# Patient Record
Sex: MALE | Race: WHITE | NOT HISPANIC OR LATINO | Employment: FULL TIME | URBAN - METROPOLITAN AREA
[De-identification: names, ages, dates, MRNs, and addresses within clinical notes are randomized per-mention and may not be internally consistent; named-entity substitution may affect disease eponyms.]

---
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
## 2018-03-19 ENCOUNTER — CLINICAL SUPPORT (OUTPATIENT)
Dept: PEDIATRIC CARDIOLOGY | Facility: CLINIC | Age: 18
End: 2018-03-19
Attending: PEDIATRICS
Payer: COMMERCIAL

## 2018-03-19 DIAGNOSIS — I47.20 VT (VENTRICULAR TACHYCARDIA): ICD-10-CM

## 2018-03-19 DIAGNOSIS — Z82.41 FAMILY HISTORY OF SUDDEN CARDIAC DEATH (SCD): ICD-10-CM

## 2018-03-19 PROCEDURE — 93295 DEV INTERROG REMOTE 1/2/MLT: CPT | Mod: S$GLB,,, | Performed by: PEDIATRICS

## 2018-03-19 PROCEDURE — 93296 REM INTERROG EVL PM/IDS: CPT | Mod: S$GLB,,, | Performed by: PEDIATRICS

## 2018-03-19 PROCEDURE — 93297 REM INTERROG DEV EVAL ICPMS: CPT | Mod: S$GLB,,, | Performed by: PEDIATRICS

## 2018-03-23 ENCOUNTER — TELEPHONE (OUTPATIENT)
Dept: PEDIATRIC CARDIOLOGY | Facility: CLINIC | Age: 18
End: 2018-03-23

## 2018-03-23 DIAGNOSIS — Z95.810 S/P ICD (INTERNAL CARDIAC DEFIBRILLATOR) PROCEDURE: ICD-10-CM

## 2018-03-23 DIAGNOSIS — I47.20 SUSTAINED VT (VENTRICULAR TACHYCARDIA): ICD-10-CM

## 2018-03-23 DIAGNOSIS — Z87.898 HISTORY OF SYNCOPE: ICD-10-CM

## 2018-03-23 DIAGNOSIS — I34.0 MITRAL VALVE INSUFFICIENCY, UNSPECIFIED ETIOLOGY: ICD-10-CM

## 2018-03-23 DIAGNOSIS — I47.20 SUSTAINED VT (VENTRICULAR TACHYCARDIA): Primary | ICD-10-CM

## 2018-03-23 RX ORDER — NADOLOL 20 MG/1
10 TABLET ORAL DAILY
Qty: 45 TABLET | Refills: 0 | Status: SHIPPED | OUTPATIENT
Start: 2018-03-23 | End: 2018-04-10 | Stop reason: SDUPTHER

## 2018-03-23 NOTE — TELEPHONE ENCOUNTER
Phoned Pablo advised him that Gudelia is going to refill Nadolol for 1 month. He needs f/u for April. Appointment made for 04/10/2018 @0900. Advised Pablo RX will be given at appointment Pablo verbalizes understanding.

## 2018-03-26 RX ORDER — NADOLOL 20 MG/1
10 TABLET ORAL DAILY
Qty: 45 TABLET | Refills: 0 | OUTPATIENT
Start: 2018-03-26 | End: 2019-03-26

## 2018-04-10 ENCOUNTER — OFFICE VISIT (OUTPATIENT)
Dept: PEDIATRIC CARDIOLOGY | Facility: CLINIC | Age: 18
End: 2018-04-10
Payer: COMMERCIAL

## 2018-04-10 VITALS
WEIGHT: 144.31 LBS | BODY MASS INDEX: 20.66 KG/M2 | HEART RATE: 50 BPM | RESPIRATION RATE: 20 BRPM | OXYGEN SATURATION: 100 % | HEIGHT: 70 IN | DIASTOLIC BLOOD PRESSURE: 58 MMHG | SYSTOLIC BLOOD PRESSURE: 108 MMHG

## 2018-04-10 DIAGNOSIS — Z87.898 HISTORY OF SYNCOPE: ICD-10-CM

## 2018-04-10 DIAGNOSIS — Z95.810 S/P ICD (INTERNAL CARDIAC DEFIBRILLATOR) PROCEDURE: ICD-10-CM

## 2018-04-10 DIAGNOSIS — I47.20 SUSTAINED VT (VENTRICULAR TACHYCARDIA): ICD-10-CM

## 2018-04-10 DIAGNOSIS — I34.0 MITRAL VALVE INSUFFICIENCY, UNSPECIFIED ETIOLOGY: ICD-10-CM

## 2018-04-10 DIAGNOSIS — Z82.41 FAMILY HISTORY OF SUDDEN CARDIAC DEATH (SCD): ICD-10-CM

## 2018-04-10 PROCEDURE — 93000 ELECTROCARDIOGRAM COMPLETE: CPT | Mod: S$GLB,,, | Performed by: PEDIATRICS

## 2018-04-10 PROCEDURE — 99214 OFFICE O/P EST MOD 30 MIN: CPT | Mod: S$GLB,,, | Performed by: NURSE PRACTITIONER

## 2018-04-10 RX ORDER — NADOLOL 20 MG/1
10 TABLET ORAL DAILY
Qty: 45 TABLET | Refills: 2 | Status: SHIPPED | OUTPATIENT
Start: 2018-04-10 | End: 2018-09-26 | Stop reason: SDUPTHER

## 2018-04-10 NOTE — PATIENT INSTRUCTIONS
Michael Valerio MD  Pediatric Cardiology  [address] [place] [address]  [phone]    General Guidelines    Name: Pablo Zamudio                   : 2000    Diagnosis:   1. Mitral valve insufficiency, unspecified etiology    2. Sustained VT (ventricular tachycardia)    3. S/P ICD (internal cardiac defibrillator) procedure    4. History of syncope    5. Family history of sudden cardiac death (SCD)        PCP: [facility]  PCP Phone Number: [phone]    · If you have an emergency or you think you have an emergency, go to the nearest emergency room!     · Breathing too fast, doesnt look right, consistently not eating well, your child needs to be checked. These are general indications that your child is not feeling well. This may be caused by anything, a stomach virus, an ear ache or heart disease, so please call [facility]. If [facility] thinks you need to be checked for your heart, they will let us know.     · If your child experiences a rapid or very slow heart rate and has the following symptoms, call [facility] or go to the nearest emergency room.   · unexplained chest pain   · does not look right   · feels like they are going to pass out   · actually passes out for unexplained reasons   · weakness or fatigue   · shortness of breath  or breathing fast   · consistent poor feeding     · If your child experiences a rapid or very slow heart rate that lasts longer than 30 minutes call [facility] or go to the nearest emergency room.     · If your child feels like they are going to pass out - have them sit down or lay down immediately. Raise the feet above the head (prop the feet on a chair or the wall) until the feeling passes. Slowly allow the child to sit, then stand. If the feeling returns, lay back down and start over.     It is very important that you notify [facility] first. [facility]  Clinic or the ER Physician can reach Dr. Michael Valerio at the office or through [facility] PICU at [phone] as needed.    Call our office ([phone]) one week after ALL tests for results.     PREVENTION OF BACTERIAL ENDOCARDITIS    A COPY OF THIS SHEET MUST BE GIVEN TO ALL OF YOUR DOCTORS OR HEALTH CARE PROVIDERS    You have received this information because you are at an increased risk for developing adverse outcomes from bacterial endocarditis or infective endocarditis.     Patient Name:  [unfilled]     : 2000   Diagnosis:   1. Mitral valve insufficiency, unspecified etiology    2. Sustained VT (ventricular tachycardia)    3. S/P ICD (internal cardiac defibrillator) procedure    4. History of syncope    5. Family history of sudden cardiac death (SCD)        As of 4/10/2018, Dr. Michael Valerio, Pediatric Cardiologist recommends that Pablo receive COMPLETE USE of antibiotic prophylaxis from bacterial endocarditis because of an existing heart condition.   Complete use antibiotic prophylaxis with dental or surgical procedures is recommended only for patients with cardiac conditions associated with the highest risk of adverse outcomes from endocarditis, includin) Artificial heart valve; 2) A history of endocarditis infection; 3) A cardiac transplantation recipients with cardiac valvular disease; 4) Certain serious congenital heart conditions including a) Unrepaired/incompletely repaired cyanotic heart disease (including shunts & conduits); b) A completely repaired congenital heart defect with prosthetic material or device for the first six months after the procedure; c) Any repaired congenital heart defect with residual defect at the site or adjacent to the site of a prosthetic patch or prosthetic device (which inhibit endothelialization).    Dental procedures for which prophylaxis is recommended in patients with the cardiac conditions are: 1) All dental procedures that involve manipulation  of gingival tissue or the periapical region of teeth or; 2) perforation of the oral mucosa.  Antibiotic prophylaxis is NOT recommended for the following dental procedures or events: routine anesthetic injections through non-infected tissue; taking dental radiographs; placement of removable prosthodontic or orthodontic appliances; adjustment of orthodontic appliances; placement of orthodontic brackets; and shedding of deciduous teeth or bleeding from trauma to the lips or oral mucosa.    Antibiotic Prophylactic Regimens Recommended for Dental Procedures  ---Regimen - Single Dose 30-60 minutes before Procedure---  Situation Agent Adults Children   Oral Amoxicillin 2g 50/mg/kg   Unable to take oral meds Ampicillin   OR  Cefazolin or ceftriaxone 2 g IM or IV1    1 g IM or IV 50 mg/kg IM or IV    50 mg/kg IM or IV   Allergic to Penicillins   or   ampicillin-    Oral regimen Cephalexin 2  OR  Clindamycin  OR  Azithromycin or clarithromycin 2 g    600 mg    500 mg 50 mg/kg    20 mg/kg    15 mg/kg   Allergic to penicillin or ampicillin and unable to take oral medications Cefazolin or ceftriaxone 3  OR  Clindamycin 1 g IM or IV      600 mg IM or IV 50 mg/kg IM or IV      20 mg/kg IM or IV   1IM - intramuscular; IV - intravenous                               2Or other first or second generation oral cephalosporin in equivalent adult or pediatric dosage.  3Cephalosporins should not be used in an individual with a history of anaphylaxis, angioedema or urticaria with penicillin or ampicillin.   Adapted from Prevention of Infective Endocarditis: Guidelines From the American Heart Association, by the Committee on Rheumatic Fever, Endocarditis, and Kawasaki Disease. Circulation, e-published April 19, 2007. Go to www.americanheart.org/presenter for more information.  The practice of giving patients antibiotics prior to a dental procedure is no longer recommended EXCEPT for patients with the highest risk of adverse outcomes  resulting from bacterial endocarditis. We cannot exclude the possibility that an exceedingly small number of cases, if any, of bacterial endocarditis may be prevented by antibiotic prophylaxis prior to a dental procedure.The importance of good oral and dental health and regular visits to the dentist is important for patients at risk for bacterial endocarditis.  Gastrointestinal (GI)/Genitourinary () Procedures: Antibiotic prophylaxis solely to prevent bacterial endocarditis is no longer recommended for patients who undergo a Gl or  tract procedure, including patients with the highest risk of adverse outcomes due to bacterial endocarditis.
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
## 2018-04-10 NOTE — LETTER
April 10, 2018      Gudelia Sylvester PA-C  [address]  [address] - [facility] Cardiology  [address]  [facility] [address]  Phone: [phone]  Fax: [phone]          Patient: Pablo Zamudio   MR Number: [id number]   YOB: 2000   Date of Visit: 4/10/2018       Dear Gudelia Sylvester:    Thank you for referring Pablo Zamudio to me for evaluation. Attached you will find relevant portions of my assessment and plan of care.    If you have questions, please do not hesitate to call me. I look forward to following Pablo Zamudio along with you.    Sincerely,    Donny Landry, NP    Enclosure  CC:  No Recipients    If you would like to receive this communication electronically, please contact [email or web address] or [phone] to request more information on [employer] access.    For providers and/or their staff who would like to refer a patient to [facility], please contact us through our one-stop-shop provider referral line, [facility], at [phone].    If you feel you have received this communication in error or would no longer like to receive these types of communications, please e-mail [email or web address]

## 2018-06-25 ENCOUNTER — CLINICAL SUPPORT (OUTPATIENT)
Dept: PEDIATRIC CARDIOLOGY | Facility: CLINIC | Age: 18
End: 2018-06-25
Payer: COMMERCIAL

## 2018-06-25 DIAGNOSIS — Z82.41 FAMILY HISTORY OF SUDDEN CARDIAC DEATH (SCD): ICD-10-CM

## 2018-06-25 DIAGNOSIS — I47.20 SUSTAINED VT (VENTRICULAR TACHYCARDIA): Primary | ICD-10-CM

## 2018-06-25 DIAGNOSIS — I47.20 VT (VENTRICULAR TACHYCARDIA): ICD-10-CM

## 2018-06-25 PROCEDURE — 93297 REM INTERROG DEV EVAL ICPMS: CPT | Mod: S$GLB,,, | Performed by: PEDIATRICS

## 2018-06-25 PROCEDURE — 93295 DEV INTERROG REMOTE 1/2/MLT: CPT | Mod: S$GLB,,, | Performed by: PEDIATRICS

## 2018-06-25 PROCEDURE — 93296 REM INTERROG EVL PM/IDS: CPT | Mod: S$GLB,,, | Performed by: PEDIATRICS

## 2018-09-26 ENCOUNTER — TELEPHONE (OUTPATIENT)
Dept: PEDIATRIC CARDIOLOGY | Facility: CLINIC | Age: 18
End: 2018-09-26

## 2018-09-26 DIAGNOSIS — Z82.41 FAMILY HISTORY OF SUDDEN CARDIAC DEATH (SCD): Primary | ICD-10-CM

## 2018-09-26 DIAGNOSIS — I47.20 SUSTAINED VT (VENTRICULAR TACHYCARDIA): ICD-10-CM

## 2018-09-26 DIAGNOSIS — Z95.810 S/P ICD (INTERNAL CARDIAC DEFIBRILLATOR) PROCEDURE: ICD-10-CM

## 2018-09-26 DIAGNOSIS — Z87.898 HISTORY OF SYNCOPE: ICD-10-CM

## 2018-09-26 DIAGNOSIS — I34.0 MITRAL VALVE INSUFFICIENCY, UNSPECIFIED ETIOLOGY: ICD-10-CM

## 2018-09-26 NOTE — TELEPHONE ENCOUNTER
----- Message from Vickie Haynes sent at 9/26/2018  3:38 PM CDT -----  Contact: Self Pablo [phone]  Needs Advice    Reason for call: Scheduling        Communication Preference: Self Pablo [phone]    Additional Information: Pt needs to schedule f/u with ICD chk, ekg and echo in [facility].

## 2018-09-26 NOTE — TELEPHONE ENCOUNTER
Notified patient refill request has been submitted to provider. After it is signed it will go to the pharmacy.

## 2018-09-26 NOTE — TELEPHONE ENCOUNTER
----- Message from Vickie Laws MA sent at 9/26/2018  3:35 PM CDT -----  Regarding: Med Rx  Contact: [phone]  Patient called stating he needs his medication refilled. He did not recall the name of the medications, but requested that we sent it to [facility].Pt's #- [phone]

## 2018-09-26 NOTE — TELEPHONE ENCOUNTER
Left message that appointments scheduled for Nov 26th at 1pm in [facility]. Call back number left if any questions or issues.

## 2018-09-27 RX ORDER — NADOLOL 20 MG/1
10 TABLET ORAL DAILY
Qty: 45 TABLET | Refills: 3 | Status: SHIPPED | OUTPATIENT
Start: 2018-09-27 | End: 2019-03-29 | Stop reason: SDUPTHER

## 2018-10-01 ENCOUNTER — CLINICAL SUPPORT (OUTPATIENT)
Dept: PEDIATRIC CARDIOLOGY | Facility: CLINIC | Age: 18
End: 2018-10-01
Payer: COMMERCIAL

## 2018-10-01 DIAGNOSIS — I47.20 SUSTAINED VT (VENTRICULAR TACHYCARDIA): ICD-10-CM

## 2018-10-01 PROCEDURE — 93295 DEV INTERROG REMOTE 1/2/MLT: CPT | Mod: S$GLB,,, | Performed by: PEDIATRICS

## 2018-10-01 PROCEDURE — 93296 REM INTERROG EVL PM/IDS: CPT | Mod: S$GLB,,, | Performed by: PEDIATRICS

## 2018-11-15 DIAGNOSIS — I47.20 SUSTAINED VT (VENTRICULAR TACHYCARDIA): Primary | ICD-10-CM

## 2018-11-15 DIAGNOSIS — Z82.41 FAMILY HISTORY OF SUDDEN CARDIAC DEATH (SCD): ICD-10-CM

## 2018-11-26 ENCOUNTER — CLINICAL SUPPORT (OUTPATIENT)
Dept: PEDIATRIC CARDIOLOGY | Facility: CLINIC | Age: 18
End: 2018-11-26
Payer: COMMERCIAL

## 2018-11-26 ENCOUNTER — OFFICE VISIT (OUTPATIENT)
Dept: PEDIATRIC CARDIOLOGY | Facility: CLINIC | Age: 18
End: 2018-11-26
Payer: COMMERCIAL

## 2018-11-26 ENCOUNTER — CLINICAL SUPPORT (OUTPATIENT)
Dept: PEDIATRIC CARDIOLOGY | Facility: CLINIC | Age: 18
End: 2018-11-26
Attending: PEDIATRICS
Payer: COMMERCIAL

## 2018-11-26 VITALS
HEIGHT: 70 IN | DIASTOLIC BLOOD PRESSURE: 59 MMHG | WEIGHT: 117.06 LBS | HEART RATE: 53 BPM | RESPIRATION RATE: 20 BRPM | OXYGEN SATURATION: 100 % | BODY MASS INDEX: 16.76 KG/M2 | SYSTOLIC BLOOD PRESSURE: 108 MMHG

## 2018-11-26 DIAGNOSIS — Z87.898 HISTORY OF SYNCOPE: ICD-10-CM

## 2018-11-26 DIAGNOSIS — I47.20 SUSTAINED VT (VENTRICULAR TACHYCARDIA): ICD-10-CM

## 2018-11-26 DIAGNOSIS — Z82.41 FAMILY HISTORY OF SUDDEN CARDIAC DEATH (SCD): ICD-10-CM

## 2018-11-26 DIAGNOSIS — Z95.810 S/P ICD (INTERNAL CARDIAC DEFIBRILLATOR) PROCEDURE: ICD-10-CM

## 2018-11-26 PROCEDURE — 99214 OFFICE O/P EST MOD 30 MIN: CPT | Mod: 25,S$GLB,, | Performed by: PEDIATRICS

## 2018-11-26 PROCEDURE — 93282 PRGRMG EVAL IMPLANTABLE DFB: CPT | Mod: S$GLB,,, | Performed by: PEDIATRICS

## 2018-11-26 PROCEDURE — 3008F BODY MASS INDEX DOCD: CPT | Mod: CPTII,S$GLB,, | Performed by: PEDIATRICS

## 2018-11-26 PROCEDURE — 93000 ELECTROCARDIOGRAM COMPLETE: CPT | Mod: S$GLB,,, | Performed by: PEDIATRICS

## 2018-11-26 NOTE — LETTER
November 29, 2018        [address] [address]             [facility] Card[facility]  [address]  [facility] [address]  Phone: [phone]  Fax: [phone]   Patient: Pablo Zamudio   MR Number: [id number]   YOB: 2000   Date of Visit: 11/26/2018       Dear [facility]:    Thank you for referring Pablo Zamudio to me for evaluation. Attached you will find relevant portions of my assessment and plan of care.    If you have questions, please do not hesitate to call me. I look forward to following Pablo Zamudio along with you.    Sincerely,      French Ballesteros MD            CC  No Recipients    Enclosure
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
## 2018-12-31 LAB
BATTERY VOLTAGE (V): 2.94 V
CHARGE TIME (SEC): 10.9 SEC
HV IMPEDANCE (OHM): 55 OHM
IMPEDANCE RA LEAD: 399 OHMS
OHS CV DC PP MS1: 0.4 MS
OHS CV DC PP V1: 2 V
P/R-WAVE RA LEAD: 6.1 MV
THRESHOLD MS RA LEAD: 0.4 MS
THRESHOLD V RA LEAD: 0.75 V

## 2019-03-01 ENCOUNTER — PATIENT MESSAGE (OUTPATIENT)
Dept: ADMINISTRATIVE | Facility: OTHER | Age: 19
End: 2019-03-01

## 2019-03-07 ENCOUNTER — TELEPHONE (OUTPATIENT)
Dept: PEDIATRIC CARDIOLOGY | Facility: CLINIC | Age: 19
End: 2019-03-07

## 2019-03-07 NOTE — TELEPHONE ENCOUNTER
Spoke with mom. Pablo past due for ICD REMOTE transmission. She stated she would call him and have him do transmission.

## 2019-03-29 DIAGNOSIS — Z95.810 S/P ICD (INTERNAL CARDIAC DEFIBRILLATOR) PROCEDURE: ICD-10-CM

## 2019-03-29 DIAGNOSIS — Z87.898 HISTORY OF SYNCOPE: ICD-10-CM

## 2019-03-29 DIAGNOSIS — I34.0 MITRAL VALVE INSUFFICIENCY, UNSPECIFIED ETIOLOGY: ICD-10-CM

## 2019-03-29 DIAGNOSIS — I47.20 SUSTAINED VT (VENTRICULAR TACHYCARDIA): ICD-10-CM

## 2019-04-01 RX ORDER — NADOLOL 20 MG/1
10 TABLET ORAL DAILY
Qty: 90 TABLET | Refills: 0 | Status: SHIPPED | OUTPATIENT
Start: 2019-04-01 | End: 2019-04-02 | Stop reason: SDUPTHER

## 2019-04-02 DIAGNOSIS — Z95.810 S/P ICD (INTERNAL CARDIAC DEFIBRILLATOR) PROCEDURE: ICD-10-CM

## 2019-04-02 DIAGNOSIS — Z87.898 HISTORY OF SYNCOPE: ICD-10-CM

## 2019-04-02 DIAGNOSIS — I47.20 SUSTAINED VT (VENTRICULAR TACHYCARDIA): ICD-10-CM

## 2019-04-02 DIAGNOSIS — I34.0 MITRAL VALVE INSUFFICIENCY, UNSPECIFIED ETIOLOGY: ICD-10-CM

## 2019-04-02 RX ORDER — NADOLOL 20 MG/1
10 TABLET ORAL DAILY
Qty: 90 TABLET | Refills: 1 | Status: SHIPPED | OUTPATIENT
Start: 2019-04-02 | End: 2019-09-30 | Stop reason: SDUPTHER

## 2019-04-05 ENCOUNTER — PATIENT MESSAGE (OUTPATIENT)
Dept: ADMINISTRATIVE | Facility: OTHER | Age: 19
End: 2019-04-05

## 2019-04-08 ENCOUNTER — CLINICAL SUPPORT (OUTPATIENT)
Dept: PEDIATRIC CARDIOLOGY | Facility: CLINIC | Age: 19
End: 2019-04-08
Attending: PEDIATRICS
Payer: COMMERCIAL

## 2019-04-08 DIAGNOSIS — Z82.41 FAMILY HISTORY OF SUDDEN CARDIAC DEATH (SCD): ICD-10-CM

## 2019-04-08 DIAGNOSIS — I47.20 SUSTAINED VT (VENTRICULAR TACHYCARDIA): ICD-10-CM

## 2019-04-08 PROCEDURE — 93296 REM INTERROG EVL PM/IDS: CPT | Mod: S$GLB,,, | Performed by: PEDIATRICS

## 2019-04-08 PROCEDURE — 93295 DEV INTERROG REMOTE 1/2/MLT: CPT | Mod: S$GLB,,, | Performed by: PEDIATRICS

## 2019-04-08 PROCEDURE — 93296 CV ICD REMOTE PEDIATRICS (CUPID ONLY): ICD-10-PCS | Mod: S$GLB,,, | Performed by: PEDIATRICS

## 2019-04-08 PROCEDURE — 93295 CV ICD REMOTE PEDIATRICS (CUPID ONLY): ICD-10-PCS | Mod: S$GLB,,, | Performed by: PEDIATRICS

## 2019-04-09 ENCOUNTER — TELEPHONE (OUTPATIENT)
Dept: PEDIATRIC CARDIOLOGY | Facility: CLINIC | Age: 19
End: 2019-04-09

## 2019-05-15 ENCOUNTER — OFFICE VISIT (OUTPATIENT)
Dept: PEDIATRIC CARDIOLOGY | Facility: CLINIC | Age: 19
End: 2019-05-15
Payer: COMMERCIAL

## 2019-05-15 VITALS
HEIGHT: 70 IN | HEART RATE: 57 BPM | RESPIRATION RATE: 16 BRPM | DIASTOLIC BLOOD PRESSURE: 64 MMHG | BODY MASS INDEX: 20.84 KG/M2 | OXYGEN SATURATION: 99 % | SYSTOLIC BLOOD PRESSURE: 112 MMHG | WEIGHT: 145.56 LBS

## 2019-05-15 DIAGNOSIS — Z87.898 HISTORY OF SYNCOPE: ICD-10-CM

## 2019-05-15 DIAGNOSIS — I47.20 SUSTAINED VT (VENTRICULAR TACHYCARDIA): ICD-10-CM

## 2019-05-15 DIAGNOSIS — I34.0 NON-RHEUMATIC MITRAL REGURGITATION: ICD-10-CM

## 2019-05-15 DIAGNOSIS — Z95.810 S/P ICD (INTERNAL CARDIAC DEFIBRILLATOR) PROCEDURE: ICD-10-CM

## 2019-05-15 PROCEDURE — 3008F BODY MASS INDEX DOCD: CPT | Mod: CPTII,S$GLB,, | Performed by: NURSE PRACTITIONER

## 2019-05-15 PROCEDURE — 3008F PR BODY MASS INDEX (BMI) DOCUMENTED: ICD-10-PCS | Mod: CPTII,S$GLB,, | Performed by: NURSE PRACTITIONER

## 2019-05-15 PROCEDURE — 93000 ELECTROCARDIOGRAM COMPLETE: CPT | Mod: S$GLB,,, | Performed by: PEDIATRICS

## 2019-05-15 PROCEDURE — 99213 OFFICE O/P EST LOW 20 MIN: CPT | Mod: S$GLB,,, | Performed by: NURSE PRACTITIONER

## 2019-05-15 PROCEDURE — 99213 PR OFFICE/OUTPT VISIT, EST, LEVL III, 20-29 MIN: ICD-10-PCS | Mod: S$GLB,,, | Performed by: NURSE PRACTITIONER

## 2019-05-15 PROCEDURE — 93000 PR ELECTROCARDIOGRAM, COMPLETE: ICD-10-PCS | Mod: S$GLB,,, | Performed by: PEDIATRICS

## 2019-05-15 NOTE — PATIENT INSTRUCTIONS
Michael Valerio MD  Pediatric Cardiology  [address] [place] [address]  [phone]    General Guidelines    Name: Pablo Zamudio                   : 2000    Diagnosis:   1. Sustained VT (ventricular tachycardia)    2. S/P ICD (internal cardiac defibrillator) procedure    3. Non-rheumatic mitral regurgitation by echo    4. History of syncope        PCP: [facility]  PCP Phone Number: [phone]    · If you have an emergency or you think you have an emergency, go to the nearest emergency room!     · Breathing too fast, doesnt look right, consistently not eating well, your child needs to be checked. These are general indications that your child is not feeling well. This may be caused by anything, a stomach virus, an ear ache or heart disease, so please call [facility]. If [facility] thinks you need to be checked for your heart, they will let us know.     · If your child experiences a rapid or very slow heart rate and has the following symptoms, call [facility] or go to the nearest emergency room.   · unexplained chest pain   · does not look right   · feels like they are going to pass out   · actually passes out for unexplained reasons   · weakness or fatigue   · shortness of breath  or breathing fast   · consistent poor feeding     · If your child experiences a rapid or very slow heart rate that lasts longer than 30 minutes call [facility] or go to the nearest emergency room.     · If your child feels like they are going to pass out - have them sit down or lay down immediately. Raise the feet above the head (prop the feet on a chair or the wall) until the feeling passes. Slowly allow the child to sit, then stand. If the feeling returns, lay back down and start over.     It is very important that you notify [facility] first. [facility] or the ER Physician can reach Dr. Michael Valerio at the  office or through [facility] at [phone] as needed.    Call our office ([phone]) one week after ALL tests for results.         PREVENTION OF BACTERIAL ENDOCARDITIS    A COPY OF THIS SHEET MUST BE GIVEN TO ALL OF YOUR DOCTORS OR HEALTH CARE PROVIDERS    You have received this information because you are at an increased risk for developing adverse outcomes from bacterial endocarditis or infective endocarditis.     Patient Name:  [unfilled]     : 2000   Diagnosis:   1. Sustained VT (ventricular tachycardia)    2. S/P ICD (internal cardiac defibrillator) procedure    3. Non-rheumatic mitral regurgitation by echo    4. History of syncope        As of 5/15/2019, Dr. Michael Valerio, Pediatric Cardiologist recommends that Pablo receive COMPLETE USE of antibiotic prophylaxis from bacterial endocarditis because of an existing heart condition.   Complete use antibiotic prophylaxis with dental or surgical procedures is recommended only for patients with cardiac conditions associated with the highest risk of adverse outcomes from endocarditis, includin) Artificial heart valve; 2) A history of endocarditis infection; 3) A cardiac transplantation recipients with cardiac valvular disease; 4) Certain serious congenital heart conditions including a) Unrepaired/incompletely repaired cyanotic heart disease (including shunts & conduits); b) A completely repaired congenital heart defect with prosthetic material or device for the first six months after the procedure; c) Any repaired congenital heart defect with residual defect at the site or adjacent to the site of a prosthetic patch or prosthetic device (which inhibit endothelialization).    Dental procedures for which prophylaxis is recommended in patients with the cardiac conditions are: 1) All dental procedures that involve manipulation of gingival tissue or the periapical region of teeth or; 2) perforation of the oral mucosa.  Antibiotic  prophylaxis is NOT recommended for the following dental procedures or events: routine anesthetic injections through non-infected tissue; taking dental radiographs; placement of removable prosthodontic or orthodontic appliances; adjustment of orthodontic appliances; placement of orthodontic brackets; and shedding of deciduous teeth or bleeding from trauma to the lips or oral mucosa.    Antibiotic Prophylactic Regimens Recommended for Dental Procedures  ---Regimen - Single Dose 30-60 minutes before Procedure---  Situation Agent Adults Children   Oral Amoxicillin 2g 50/mg/kg   Unable to take oral meds Ampicillin   OR  Cefazolin or ceftriaxone 2 g IM or IV1    1 g IM or IV 50 mg/kg IM or IV    50 mg/kg IM or IV   Allergic to Penicillins   or   ampicillin-    Oral regimen Cephalexin 2  OR  Clindamycin  OR  Azithromycin or clarithromycin 2 g    600 mg    500 mg 50 mg/kg    20 mg/kg    15 mg/kg   Allergic to penicillin or ampicillin and unable to take oral medications Cefazolin or ceftriaxone 3  OR  Clindamycin 1 g IM or IV      600 mg IM or IV 50 mg/kg IM or IV      20 mg/kg IM or IV   1IM - intramuscular; IV - intravenous                               2Or other first or second generation oral cephalosporin in equivalent adult or pediatric dosage.  3Cephalosporins should not be used in an individual with a history of anaphylaxis, angioedema or urticaria with penicillin or ampicillin.   Adapted from Prevention of Infective Endocarditis: Guidelines From the American Heart Association, by the Committee on Rheumatic Fever, Endocarditis, and Kawasaki Disease. Circulation, e-published April 19, 2007. Go to www.americanheart.org/presenter for more information.  The practice of giving patients antibiotics prior to a dental procedure is no longer recommended EXCEPT for patients with the highest risk of adverse outcomes resulting from bacterial endocarditis. We cannot exclude the possibility that an exceedingly small number of  cases, if any, of bacterial endocarditis may be prevented by antibiotic prophylaxis prior to a dental procedure.The importance of good oral and dental health and regular visits to the dentist is important for patients at risk for bacterial endocarditis.  Gastrointestinal (GI)/Genitourinary () Procedures: Antibiotic prophylaxis solely to prevent bacterial endocarditis is no longer recommended for patients who undergo a Gl or  tract procedure, including patients with the highest risk of adverse outcomes due to bacterial endocarditis.
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
## 2019-05-15 NOTE — LETTER
May 15, 2019      [address] [address]           [facility]  [address]  [facility] [address]  Phone: [phone]  Fax: [phone]          Patient: Pablo Zamudio   MR Number: [id number]   YOB: 2000   Date of Visit: 5/15/2019       Dear Donny Landry:    Thank you for referring Pablo Zamudio to me for evaluation. Attached you will find relevant portions of my assessment and plan of care.    If you have questions, please do not hesitate to call me. I look forward to following Pablo Zamudio along with you.    Sincerely,    SANDY Purvis,PNP-C    Enclosure  CC:  No Recipients    If you would like to receive this communication electronically, please contact [email or web address] or [phone] to request more information on [facility] [employer] access.    For providers and/or their staff who would like to refer a patient to [facility], please contact us through our one-stop-shop provider referral line, [facility], at [phone].    If you feel you have received this communication in error or would no longer like to receive these types of communications, please e-mail [email or web address]

## 2019-05-31 LAB
BATTERY VOLTAGE (V): 2.9 V
CHARGE TIME (SEC): 10.9 SEC
ERI (V): 2.63 V
IMPEDANCE RA LEAD: 361 OHMS
OHS CV DC PP MS1: 0.4 MS
OHS CV DC PP V1: 2 V
P/R-WAVE RA LEAD: 7.1 MV
THRESHOLD MS RA LEAD: 0.4 MS
THRESHOLD V RA LEAD: 0.75 V

## 2019-07-08 ENCOUNTER — CLINICAL SUPPORT (OUTPATIENT)
Dept: PEDIATRIC CARDIOLOGY | Facility: CLINIC | Age: 19
End: 2019-07-08
Attending: PEDIATRICS
Payer: COMMERCIAL

## 2019-07-08 DIAGNOSIS — Z82.41 FAMILY HISTORY OF SUDDEN CARDIAC DEATH (SCD): ICD-10-CM

## 2019-07-08 DIAGNOSIS — I47.20 SUSTAINED VT (VENTRICULAR TACHYCARDIA): ICD-10-CM

## 2019-07-08 PROCEDURE — 93296 CV ICD REMOTE PEDIATRICS (CUPID ONLY): ICD-10-PCS | Mod: S$GLB,,, | Performed by: PEDIATRICS

## 2019-07-08 PROCEDURE — 93295 DEV INTERROG REMOTE 1/2/MLT: CPT | Mod: S$GLB,,, | Performed by: PEDIATRICS

## 2019-07-08 PROCEDURE — 93295 CV ICD REMOTE PEDIATRICS (CUPID ONLY): ICD-10-PCS | Mod: S$GLB,,, | Performed by: PEDIATRICS

## 2019-07-08 PROCEDURE — 93296 REM INTERROG EVL PM/IDS: CPT | Mod: S$GLB,,, | Performed by: PEDIATRICS

## 2019-09-09 LAB
BATTERY VOLTAGE (V): 2.83 V
CHARGE TIME (SEC): NORMAL SEC
HV IMPEDANCE (OHM): 53 OHM
IMPEDANCE RA LEAD: 361 OHMS
OHS CV DC PP MS1: 0.4 MS
OHS CV DC PP V1: 2 V
P/R-WAVE RA LEAD: 7.5 MV
THRESHOLD MS RA LEAD: 0.4 MS
THRESHOLD V RA LEAD: 0.75 V

## 2019-09-30 DIAGNOSIS — I34.0 MITRAL VALVE INSUFFICIENCY, UNSPECIFIED ETIOLOGY: ICD-10-CM

## 2019-09-30 DIAGNOSIS — I47.20 SUSTAINED VT (VENTRICULAR TACHYCARDIA): ICD-10-CM

## 2019-09-30 DIAGNOSIS — Z95.810 S/P ICD (INTERNAL CARDIAC DEFIBRILLATOR) PROCEDURE: ICD-10-CM

## 2019-09-30 DIAGNOSIS — Z87.898 HISTORY OF SYNCOPE: ICD-10-CM

## 2019-09-30 RX ORDER — NADOLOL 20 MG/1
10 TABLET ORAL DAILY
Qty: 90 TABLET | Refills: 1 | Status: SHIPPED | OUTPATIENT
Start: 2019-09-30 | End: 2020-12-16 | Stop reason: SDUPTHER

## 2019-10-08 DIAGNOSIS — Z95.810 S/P ICD (INTERNAL CARDIAC DEFIBRILLATOR) PROCEDURE: ICD-10-CM

## 2019-10-08 DIAGNOSIS — I47.20 SUSTAINED VT (VENTRICULAR TACHYCARDIA): Primary | ICD-10-CM

## 2019-10-08 DIAGNOSIS — Z82.41 FAMILY HISTORY OF SUDDEN CARDIAC DEATH (SCD): ICD-10-CM

## 2019-10-08 DIAGNOSIS — Z87.898 HISTORY OF SYNCOPE: ICD-10-CM

## 2019-10-14 ENCOUNTER — CLINICAL SUPPORT (OUTPATIENT)
Dept: PEDIATRIC CARDIOLOGY | Facility: CLINIC | Age: 19
End: 2019-10-14
Attending: PEDIATRICS
Payer: COMMERCIAL

## 2019-10-14 DIAGNOSIS — Z82.41 FAMILY HISTORY OF SUDDEN CARDIAC DEATH (SCD): ICD-10-CM

## 2019-10-14 DIAGNOSIS — Z95.810 S/P ICD (INTERNAL CARDIAC DEFIBRILLATOR) PROCEDURE: ICD-10-CM

## 2019-10-14 DIAGNOSIS — I47.20 SUSTAINED VT (VENTRICULAR TACHYCARDIA): ICD-10-CM

## 2019-10-14 DIAGNOSIS — Z87.898 HISTORY OF SYNCOPE: ICD-10-CM

## 2019-10-14 LAB
BATTERY VOLTAGE (V): 2.79 V
CHARGE TIME (SEC): 11.5 SEC
ERI (V): 2.63 V
HV IMPEDANCE (OHM): 56 OHM
IMPEDANCE RA LEAD: 399 OHMS
OHS CV DC PP MS1: 0.4 MS
OHS CV DC PP V1: 2 V
P/R-WAVE RA LEAD: 7.3 MV
THRESHOLD MS RA LEAD: 0.4 MS
THRESHOLD V RA LEAD: 0.75 V

## 2019-10-14 PROCEDURE — 93295 DEV INTERROG REMOTE 1/2/MLT: CPT | Mod: S$GLB,,, | Performed by: PEDIATRICS

## 2019-10-14 PROCEDURE — 93295 CV ICD REMOTE PEDIATRICS (CUPID ONLY): ICD-10-PCS | Mod: S$GLB,,, | Performed by: PEDIATRICS

## 2019-10-14 PROCEDURE — 93296 CV ICD REMOTE PEDIATRICS (CUPID ONLY): ICD-10-PCS | Mod: S$GLB,,, | Performed by: PEDIATRICS

## 2019-10-14 PROCEDURE — 93296 REM INTERROG EVL PM/IDS: CPT | Mod: S$GLB,,, | Performed by: PEDIATRICS

## 2020-01-20 ENCOUNTER — CLINICAL SUPPORT (OUTPATIENT)
Dept: PEDIATRIC CARDIOLOGY | Facility: CLINIC | Age: 20
End: 2020-01-20
Attending: PEDIATRICS
Payer: COMMERCIAL

## 2020-01-20 DIAGNOSIS — I47.20 SUSTAINED VT (VENTRICULAR TACHYCARDIA): ICD-10-CM

## 2020-01-20 DIAGNOSIS — Z87.898 HISTORY OF SYNCOPE: ICD-10-CM

## 2020-01-20 DIAGNOSIS — Z95.810 S/P ICD (INTERNAL CARDIAC DEFIBRILLATOR) PROCEDURE: ICD-10-CM

## 2020-01-20 DIAGNOSIS — Z82.41 FAMILY HISTORY OF SUDDEN CARDIAC DEATH (SCD): ICD-10-CM

## 2020-01-20 PROCEDURE — 93295 CV ICD REMOTE PEDIATRICS (CUPID ONLY): ICD-10-PCS | Mod: S$GLB,,, | Performed by: PEDIATRICS

## 2020-01-20 PROCEDURE — 93295 DEV INTERROG REMOTE 1/2/MLT: CPT | Mod: S$GLB,,, | Performed by: PEDIATRICS

## 2020-01-21 LAB
BATTERY VOLTAGE (V): 2.7 V
CHARGE TIME (SEC): 12.2 SEC
ERI (V): 2.63 V
HV IMPEDANCE (OHM): 56 OHM
IMPEDANCE RA LEAD: 399 OHMS
OHS CV DC PP MS1: 0.4 MS
OHS CV DC PP V1: 2 V
P/R-WAVE RA LEAD: 8.4 MV
THRESHOLD MS RA LEAD: 0.4 MS
THRESHOLD V RA LEAD: 0.75 V

## 2020-04-20 ENCOUNTER — CLINICAL SUPPORT (OUTPATIENT)
Dept: PEDIATRIC CARDIOLOGY | Facility: CLINIC | Age: 20
End: 2020-04-20
Attending: PEDIATRICS
Payer: COMMERCIAL

## 2020-04-20 DIAGNOSIS — Z82.41 FAMILY HISTORY OF SUDDEN CARDIAC DEATH (SCD): ICD-10-CM

## 2020-04-20 DIAGNOSIS — Z95.810 S/P ICD (INTERNAL CARDIAC DEFIBRILLATOR) PROCEDURE: ICD-10-CM

## 2020-04-20 DIAGNOSIS — Z87.898 HISTORY OF SYNCOPE: ICD-10-CM

## 2020-04-20 DIAGNOSIS — I47.20 SUSTAINED VT (VENTRICULAR TACHYCARDIA): ICD-10-CM

## 2020-04-20 PROCEDURE — 93295 CV ICD REMOTE PEDIATRICS (CUPID ONLY): ICD-10-PCS | Mod: S$GLB,,, | Performed by: PEDIATRICS

## 2020-04-20 PROCEDURE — 93295 DEV INTERROG REMOTE 1/2/MLT: CPT | Mod: S$GLB,,, | Performed by: PEDIATRICS

## 2020-04-27 LAB
BATTERY VOLTAGE (V): 2.65 V
CHARGE TIME (SEC): 12.2 SEC
ERI (V): 2.63 V
HV IMPEDANCE (OHM): 73 OHM
IMPEDANCE RA LEAD: 456 OHMS
OHS CV DC PP MS1: 0.4 MS
OHS CV DC PP V1: NORMAL V
P/R-WAVE RA LEAD: 9.3 MV
THRESHOLD MS RA LEAD: 0.4 MS
THRESHOLD V RA LEAD: 0.88 V

## 2020-07-17 ENCOUNTER — TELEPHONE (OUTPATIENT)
Dept: PEDIATRIC CARDIOLOGY | Facility: CLINIC | Age: 20
End: 2020-07-17

## 2020-07-17 NOTE — TELEPHONE ENCOUNTER
Left message requesting Pablo Zamudio perform REMOTE device check. Call back number left in message if any questions or issues.

## 2020-07-20 ENCOUNTER — CLINICAL SUPPORT (OUTPATIENT)
Dept: PEDIATRIC CARDIOLOGY | Facility: CLINIC | Age: 20
End: 2020-07-20
Attending: PEDIATRICS
Payer: COMMERCIAL

## 2020-07-20 DIAGNOSIS — Z95.810 S/P ICD (INTERNAL CARDIAC DEFIBRILLATOR) PROCEDURE: ICD-10-CM

## 2020-07-20 DIAGNOSIS — I47.20 SUSTAINED VT (VENTRICULAR TACHYCARDIA): ICD-10-CM

## 2020-07-20 DIAGNOSIS — Z87.898 HISTORY OF SYNCOPE: ICD-10-CM

## 2020-07-20 DIAGNOSIS — Z82.41 FAMILY HISTORY OF SUDDEN CARDIAC DEATH (SCD): Primary | ICD-10-CM

## 2020-07-20 DIAGNOSIS — Z82.41 FAMILY HISTORY OF SUDDEN CARDIAC DEATH (SCD): ICD-10-CM

## 2020-07-20 PROCEDURE — 93295 CV ICD REMOTE PEDIATRICS (CUPID ONLY): ICD-10-PCS | Mod: S$GLB,,, | Performed by: PEDIATRICS

## 2020-07-20 PROCEDURE — 93296 CV ICD REMOTE PEDIATRICS (CUPID ONLY): ICD-10-PCS | Mod: S$GLB,,, | Performed by: PEDIATRICS

## 2020-07-20 PROCEDURE — 93296 REM INTERROG EVL PM/IDS: CPT | Mod: S$GLB,,, | Performed by: PEDIATRICS

## 2020-07-20 PROCEDURE — 93295 DEV INTERROG REMOTE 1/2/MLT: CPT | Mod: S$GLB,,, | Performed by: PEDIATRICS

## 2020-07-21 LAB
BATTERY VOLTAGE (V): 2.64 V
CHARGE TIME (SEC): 12.9 SEC
ERI (V): 2.63 V
HV IMPEDANCE (OHM): 71 OHM
IMPEDANCE RA LEAD: 399 OHMS
OHS CV DC PP MS1: 0.4 MS
OHS CV DC PP V1: NORMAL V
P/R-WAVE RA LEAD: 8.8 MV
THRESHOLD MS RA LEAD: 0.4 MS
THRESHOLD V RA LEAD: 0.88 V

## 2020-09-28 ENCOUNTER — HOSPITAL ENCOUNTER (OUTPATIENT)
Dept: PEDIATRIC CARDIOLOGY | Facility: HOSPITAL | Age: 20
Discharge: HOME | End: 2020-09-28
Attending: PEDIATRICS
Payer: COMMERCIAL

## 2020-09-28 DIAGNOSIS — Z87.898 HISTORY OF SYNCOPE: ICD-10-CM

## 2020-09-28 DIAGNOSIS — I47.20 SUSTAINED VT (VENTRICULAR TACHYCARDIA): ICD-10-CM

## 2020-09-28 DIAGNOSIS — Z82.41 FAMILY HISTORY OF SUDDEN CARDIAC DEATH (SCD): ICD-10-CM

## 2020-09-28 DIAGNOSIS — Z95.810 S/P ICD (INTERNAL CARDIAC DEFIBRILLATOR) PROCEDURE: ICD-10-CM

## 2020-09-28 PROCEDURE — 93295 CV ICD REMOTE PEDIATRICS (CUPID ONLY): ICD-10-PCS | Mod: ,,, | Performed by: PEDIATRICS

## 2020-09-28 PROCEDURE — 93296 REM INTERROG EVL PM/IDS: CPT

## 2020-09-28 PROCEDURE — 93295 DEV INTERROG REMOTE 1/2/MLT: CPT | Mod: ,,, | Performed by: PEDIATRICS

## 2020-09-30 LAB
BATTERY VOLTAGE (V): 2.62 V
CHARGE TIME (SEC): 12.9 SEC
ERI (V): 2.63 V
HV IMPEDANCE (OHM): 53 OHM
IMPEDANCE RA LEAD: 399 OHMS
OHS CV DC PP MS1: 0.4 MS
OHS CV DC PP V1: NORMAL V
P/R-WAVE RA LEAD: 6.9 MV
THRESHOLD MS RA LEAD: 0.4 MS
THRESHOLD V RA LEAD: 1 V

## 2020-10-30 ENCOUNTER — TELEPHONE (OUTPATIENT)
Dept: PEDIATRIC CARDIOLOGY | Facility: CLINIC | Age: 20
End: 2020-10-30

## 2020-10-30 NOTE — TELEPHONE ENCOUNTER
Spoke to patient, informed Pablo he is scheduled on Monday to do a remote transmission on device. Informed transmission can be sent anytime before Monday. Verbalized understanding.

## 2020-11-02 ENCOUNTER — HOSPITAL ENCOUNTER (OUTPATIENT)
Dept: PEDIATRIC CARDIOLOGY | Facility: HOSPITAL | Age: 20
Discharge: HOME | End: 2020-11-02
Attending: PEDIATRICS
Payer: COMMERCIAL

## 2020-11-02 DIAGNOSIS — Z95.810 S/P ICD (INTERNAL CARDIAC DEFIBRILLATOR) PROCEDURE: ICD-10-CM

## 2020-11-02 DIAGNOSIS — I47.20 SUSTAINED VT (VENTRICULAR TACHYCARDIA): ICD-10-CM

## 2020-11-02 DIAGNOSIS — Z87.898 HISTORY OF SYNCOPE: ICD-10-CM

## 2020-11-02 DIAGNOSIS — Z82.41 FAMILY HISTORY OF SUDDEN CARDIAC DEATH (SCD): ICD-10-CM

## 2020-11-02 LAB
BATTERY VOLTAGE (V): 2.62 V
CHARGE TIME (SEC): 12.9 SEC
ERI (V): 2.63 V
HV IMPEDANCE (OHM): 62 OHM
IMPEDANCE RA LEAD: 418 OHMS
OHS CV DC PP MS1: 0.4 MS
OHS CV DC PP V1: NORMAL V
P/R-WAVE RA LEAD: 8.5 MV
THRESHOLD MS RA LEAD: 0.4 MS
THRESHOLD V RA LEAD: 0.88 V

## 2020-11-02 PROCEDURE — 93295 CV ICD REMOTE PEDIATRICS (CUPID ONLY): ICD-10-PCS | Mod: ,,, | Performed by: PEDIATRICS

## 2020-11-02 PROCEDURE — 93295 DEV INTERROG REMOTE 1/2/MLT: CPT | Mod: ,,, | Performed by: PEDIATRICS

## 2020-11-02 PROCEDURE — 93296 REM INTERROG EVL PM/IDS: CPT

## 2020-12-07 ENCOUNTER — TELEPHONE (OUTPATIENT)
Dept: PEDIATRIC CARDIOLOGY | Facility: CLINIC | Age: 20
End: 2020-12-07

## 2020-12-07 ENCOUNTER — HOSPITAL ENCOUNTER (OUTPATIENT)
Dept: PEDIATRIC CARDIOLOGY | Facility: HOSPITAL | Age: 20
Discharge: HOME | End: 2020-12-07
Attending: PEDIATRICS
Payer: COMMERCIAL

## 2020-12-07 DIAGNOSIS — I47.20 SUSTAINED VT (VENTRICULAR TACHYCARDIA): ICD-10-CM

## 2020-12-07 DIAGNOSIS — Z82.41 FAMILY HISTORY OF SUDDEN CARDIAC DEATH (SCD): ICD-10-CM

## 2020-12-07 DIAGNOSIS — Z95.810 S/P ICD (INTERNAL CARDIAC DEFIBRILLATOR) PROCEDURE: ICD-10-CM

## 2020-12-07 DIAGNOSIS — Z87.898 HISTORY OF SYNCOPE: ICD-10-CM

## 2020-12-07 PROCEDURE — 93295 DEV INTERROG REMOTE 1/2/MLT: CPT | Mod: ,,, | Performed by: PEDIATRICS

## 2020-12-07 PROCEDURE — 93296 REM INTERROG EVL PM/IDS: CPT

## 2020-12-07 PROCEDURE — 93295 CV ICD REMOTE PEDIATRICS (CUPID ONLY): ICD-10-PCS | Mod: ,,, | Performed by: PEDIATRICS

## 2020-12-11 LAB
BATTERY VOLTAGE (V): 2.62 V
CHARGE TIME (SEC): 14.3 SEC
ERI (V): 2.63 V
HV IMPEDANCE (OHM): 58 OHM
IMPEDANCE RA LEAD: 399 OHMS
OHS CV DC PP MS1: 0.4 MS
OHS CV DC PP V1: NORMAL V
THRESHOLD MS RA LEAD: 0.4 MS
THRESHOLD V RA LEAD: 0.75 V

## 2020-12-16 DIAGNOSIS — I47.20 SUSTAINED VT (VENTRICULAR TACHYCARDIA): ICD-10-CM

## 2020-12-16 DIAGNOSIS — I34.0 MITRAL VALVE INSUFFICIENCY, UNSPECIFIED ETIOLOGY: ICD-10-CM

## 2020-12-16 DIAGNOSIS — Z95.810 S/P ICD (INTERNAL CARDIAC DEFIBRILLATOR) PROCEDURE: ICD-10-CM

## 2020-12-16 DIAGNOSIS — Z87.898 HISTORY OF SYNCOPE: ICD-10-CM

## 2020-12-17 RX ORDER — NADOLOL 20 MG/1
10 TABLET ORAL DAILY
Qty: 15 TABLET | Refills: 0 | Status: SHIPPED | OUTPATIENT
Start: 2020-12-17 | End: 2021-01-06

## 2020-12-22 DIAGNOSIS — I47.20 SUSTAINED VT (VENTRICULAR TACHYCARDIA): Primary | ICD-10-CM

## 2020-12-22 DIAGNOSIS — Z87.898 HISTORY OF SYNCOPE: ICD-10-CM

## 2021-01-04 ENCOUNTER — HOSPITAL ENCOUNTER (OUTPATIENT)
Dept: PEDIATRIC CARDIOLOGY | Facility: HOSPITAL | Age: 21
Discharge: HOME | End: 2021-01-04
Attending: PEDIATRICS
Payer: COMMERCIAL

## 2021-01-04 ENCOUNTER — PATIENT MESSAGE (OUTPATIENT)
Dept: PEDIATRIC CARDIOLOGY | Facility: CLINIC | Age: 21
End: 2021-01-04

## 2021-01-04 ENCOUNTER — TELEPHONE (OUTPATIENT)
Dept: PEDIATRIC CARDIOLOGY | Facility: CLINIC | Age: 21
End: 2021-01-04

## 2021-01-04 DIAGNOSIS — Z87.898 HISTORY OF SYNCOPE: ICD-10-CM

## 2021-01-04 DIAGNOSIS — Z82.41 FAMILY HISTORY OF SUDDEN CARDIAC DEATH (SCD): Primary | ICD-10-CM

## 2021-01-04 DIAGNOSIS — I47.20 SUSTAINED VT (VENTRICULAR TACHYCARDIA): ICD-10-CM

## 2021-01-04 DIAGNOSIS — Z95.810 S/P ICD (INTERNAL CARDIAC DEFIBRILLATOR) PROCEDURE: ICD-10-CM

## 2021-01-04 DIAGNOSIS — Z82.41 FAMILY HISTORY OF SUDDEN CARDIAC DEATH (SCD): ICD-10-CM

## 2021-01-04 LAB
BATTERY VOLTAGE (V): 2.62 V
CHARGE TIME (SEC): 14.3 SEC
ERI (V): 2.63 V
HV IMPEDANCE (OHM): 54 OHM
IMPEDANCE RA LEAD: 399 OHMS
OHS CV DC PP MS1: 0.4 MS
OHS CV DC PP V1: NORMAL V
P/R-WAVE RA LEAD: 7.8 MV
THRESHOLD MS RA LEAD: 0.4 MS
THRESHOLD V RA LEAD: 0.62 V

## 2021-01-04 PROCEDURE — 93295 DEV INTERROG REMOTE 1/2/MLT: CPT | Mod: ,,, | Performed by: PEDIATRICS

## 2021-01-04 PROCEDURE — 93296 REM INTERROG EVL PM/IDS: CPT

## 2021-01-04 PROCEDURE — 93295 CV ICD REMOTE PEDIATRICS (CUPID ONLY): ICD-10-PCS | Mod: ,,, | Performed by: PEDIATRICS

## 2021-01-05 ENCOUNTER — PATIENT MESSAGE (OUTPATIENT)
Dept: PEDIATRIC CARDIOLOGY | Facility: CLINIC | Age: 21
End: 2021-01-05

## 2021-01-06 ENCOUNTER — OFFICE VISIT (OUTPATIENT)
Dept: PEDIATRIC CARDIOLOGY | Facility: CLINIC | Age: 21
End: 2021-01-06
Payer: COMMERCIAL

## 2021-01-06 ENCOUNTER — TELEPHONE (OUTPATIENT)
Dept: PEDIATRIC CARDIOLOGY | Facility: CLINIC | Age: 21
End: 2021-01-06

## 2021-01-06 VITALS
BODY MASS INDEX: 21.68 KG/M2 | DIASTOLIC BLOOD PRESSURE: 72 MMHG | SYSTOLIC BLOOD PRESSURE: 108 MMHG | WEIGHT: 151.44 LBS | HEART RATE: 50 BPM | RESPIRATION RATE: 16 BRPM | HEIGHT: 70 IN | OXYGEN SATURATION: 98 %

## 2021-01-06 DIAGNOSIS — Z87.898 HISTORY OF SYNCOPE: ICD-10-CM

## 2021-01-06 DIAGNOSIS — Z95.810 S/P ICD (INTERNAL CARDIAC DEFIBRILLATOR) PROCEDURE: ICD-10-CM

## 2021-01-06 DIAGNOSIS — I47.20 SUSTAINED VT (VENTRICULAR TACHYCARDIA): ICD-10-CM

## 2021-01-06 DIAGNOSIS — I34.0 MITRAL VALVE INSUFFICIENCY, UNSPECIFIED ETIOLOGY: ICD-10-CM

## 2021-01-06 DIAGNOSIS — Z82.41 FAMILY HISTORY OF SUDDEN CARDIAC DEATH (SCD): ICD-10-CM

## 2021-01-06 DIAGNOSIS — Z95.810 S/P ICD (INTERNAL CARDIAC DEFIBRILLATOR) PROCEDURE: Primary | ICD-10-CM

## 2021-01-06 DIAGNOSIS — I34.0 NONRHEUMATIC MITRAL VALVE REGURGITATION: ICD-10-CM

## 2021-01-06 PROBLEM — I77.89 ENLARGEMENT OF AORTIC ROOT: Status: RESOLVED | Noted: 2017-09-06 | Resolved: 2021-01-06

## 2021-01-06 PROCEDURE — 99214 PR OFFICE/OUTPT VISIT, EST, LEVL IV, 30-39 MIN: ICD-10-PCS | Mod: 95,,, | Performed by: PEDIATRICS

## 2021-01-06 PROCEDURE — 3008F PR BODY MASS INDEX (BMI) DOCUMENTED: ICD-10-PCS | Mod: CPTII,S$GLB,, | Performed by: PHYSICIAN ASSISTANT

## 2021-01-06 PROCEDURE — 99215 PR OFFICE/OUTPT VISIT, EST, LEVL V, 40-54 MIN: ICD-10-PCS | Mod: 25,S$GLB,, | Performed by: PHYSICIAN ASSISTANT

## 2021-01-06 PROCEDURE — 99215 OFFICE O/P EST HI 40 MIN: CPT | Mod: 25,S$GLB,, | Performed by: PHYSICIAN ASSISTANT

## 2021-01-06 PROCEDURE — 99214 OFFICE O/P EST MOD 30 MIN: CPT | Mod: 95,,, | Performed by: PEDIATRICS

## 2021-01-06 PROCEDURE — 3008F BODY MASS INDEX DOCD: CPT | Mod: CPTII,S$GLB,, | Performed by: PHYSICIAN ASSISTANT

## 2021-01-06 PROCEDURE — 93000 EKG 12-LEAD: ICD-10-PCS | Mod: S$GLB,,, | Performed by: PEDIATRICS

## 2021-01-06 PROCEDURE — 93000 ELECTROCARDIOGRAM COMPLETE: CPT | Mod: S$GLB,,, | Performed by: PEDIATRICS

## 2021-01-06 RX ORDER — NADOLOL 20 MG/1
10 TABLET ORAL DAILY
Qty: 45 TABLET | Refills: 1 | Status: SHIPPED | OUTPATIENT
Start: 2021-01-06 | End: 2021-08-16

## 2021-01-22 ENCOUNTER — TELEPHONE (OUTPATIENT)
Dept: PEDIATRIC CARDIOLOGY | Facility: CLINIC | Age: 21
End: 2021-01-22

## 2021-01-28 ENCOUNTER — ANESTHESIA EVENT (OUTPATIENT)
Dept: MEDSURG UNIT | Facility: HOSPITAL | Age: 21
End: 2021-01-28
Payer: COMMERCIAL

## 2021-01-28 ENCOUNTER — TELEPHONE (OUTPATIENT)
Dept: PEDIATRIC CARDIOLOGY | Facility: CLINIC | Age: 21
End: 2021-01-28

## 2021-01-29 ENCOUNTER — HOSPITAL ENCOUNTER (OUTPATIENT)
Facility: HOSPITAL | Age: 21
Discharge: HOME | End: 2021-01-29
Attending: PEDIATRICS | Admitting: PEDIATRICS
Payer: COMMERCIAL

## 2021-01-29 ENCOUNTER — ANESTHESIA (OUTPATIENT)
Dept: MEDSURG UNIT | Facility: HOSPITAL | Age: 21
End: 2021-01-29
Payer: COMMERCIAL

## 2021-01-29 VITALS
SYSTOLIC BLOOD PRESSURE: 103 MMHG | RESPIRATION RATE: 18 BRPM | HEART RATE: 51 BPM | BODY MASS INDEX: 22.22 KG/M2 | DIASTOLIC BLOOD PRESSURE: 56 MMHG | HEIGHT: 69 IN | WEIGHT: 150 LBS | OXYGEN SATURATION: 100 % | TEMPERATURE: 98 F

## 2021-01-29 DIAGNOSIS — I47.20 VT (VENTRICULAR TACHYCARDIA): ICD-10-CM

## 2021-01-29 DIAGNOSIS — Z87.898 HISTORY OF SYNCOPE: ICD-10-CM

## 2021-01-29 DIAGNOSIS — Z95.810 S/P ICD (INTERNAL CARDIAC DEFIBRILLATOR) PROCEDURE: Primary | ICD-10-CM

## 2021-01-29 DIAGNOSIS — Z82.41 FAMILY HISTORY OF SUDDEN CARDIAC DEATH (SCD): ICD-10-CM

## 2021-01-29 DIAGNOSIS — R55 SYNCOPE: ICD-10-CM

## 2021-01-29 DIAGNOSIS — Z45.02 ENCOUNTER FOR SERVICING OF IMPLANTABLE CARDIOVERTER-DEFIBRILLATOR (ICD) AT END OF BATTERY LIFE: ICD-10-CM

## 2021-01-29 LAB — SARS-COV-2 RDRP RESP QL NAA+PROBE: NEGATIVE

## 2021-01-29 PROCEDURE — 63600175 PHARM REV CODE 636 W HCPCS: Performed by: PEDIATRICS

## 2021-01-29 PROCEDURE — 25000003 PHARM REV CODE 250: Performed by: NURSE ANESTHETIST, CERTIFIED REGISTERED

## 2021-01-29 PROCEDURE — 63600175 PHARM REV CODE 636 W HCPCS: Performed by: NURSE ANESTHETIST, CERTIFIED REGISTERED

## 2021-01-29 PROCEDURE — 37000008 HC ANESTHESIA 1ST 15 MINUTES: Performed by: PEDIATRICS

## 2021-01-29 PROCEDURE — D9220A PRA ANESTHESIA: Mod: ANES,,, | Performed by: ANESTHESIOLOGY

## 2021-01-29 PROCEDURE — A4216 STERILE WATER/SALINE, 10 ML: HCPCS | Performed by: PEDIATRICS

## 2021-01-29 PROCEDURE — 33262 PR REMV&REPLC CVD GEN SING LEAD: ICD-10-PCS | Mod: ,,, | Performed by: PEDIATRICS

## 2021-01-29 PROCEDURE — 33262 RMVL& REPLC PULSE GEN 1 LEAD: CPT | Performed by: PEDIATRICS

## 2021-01-29 PROCEDURE — D9220A PRA ANESTHESIA: ICD-10-PCS | Mod: CRNA,,, | Performed by: NURSE ANESTHETIST, CERTIFIED REGISTERED

## 2021-01-29 PROCEDURE — 93010 ELECTROCARDIOGRAM REPORT: CPT | Mod: ,,, | Performed by: INTERNAL MEDICINE

## 2021-01-29 PROCEDURE — 25000003 PHARM REV CODE 250: Performed by: PEDIATRICS

## 2021-01-29 PROCEDURE — D9220A PRA ANESTHESIA: ICD-10-PCS | Mod: ANES,,, | Performed by: ANESTHESIOLOGY

## 2021-01-29 PROCEDURE — 93005 ELECTROCARDIOGRAM TRACING: CPT

## 2021-01-29 PROCEDURE — U0002 COVID-19 LAB TEST NON-CDC: HCPCS

## 2021-01-29 PROCEDURE — 33262 RMVL& REPLC PULSE GEN 1 LEAD: CPT | Mod: ,,, | Performed by: PEDIATRICS

## 2021-01-29 PROCEDURE — D9220A PRA ANESTHESIA: Mod: CRNA,,, | Performed by: NURSE ANESTHETIST, CERTIFIED REGISTERED

## 2021-01-29 PROCEDURE — C1722 AICD, SINGLE CHAMBER: HCPCS | Performed by: PEDIATRICS

## 2021-01-29 PROCEDURE — 93010 EKG 12-LEAD: ICD-10-PCS | Mod: ,,, | Performed by: INTERNAL MEDICINE

## 2021-01-29 PROCEDURE — 37000009 HC ANESTHESIA EA ADD 15 MINS: Performed by: PEDIATRICS

## 2021-01-29 PROCEDURE — 27201423 OPTIME MED/SURG SUP & DEVICES STERILE SUPPLY: Performed by: PEDIATRICS

## 2021-01-29 DEVICE — ICD DVFC3D4 VISIA AF S VR DF4 MRI US
Type: IMPLANTABLE DEVICE | Site: CHEST | Status: FUNCTIONAL
Brand: VISIA AF MRI™ S VR SURESCAN™

## 2021-01-29 RX ORDER — CEFAZOLIN SODIUM 1 G/3ML
2 INJECTION, POWDER, FOR SOLUTION INTRAMUSCULAR; INTRAVENOUS
Status: COMPLETED | OUTPATIENT
Start: 2021-01-29 | End: 2021-01-29

## 2021-01-29 RX ORDER — ONDANSETRON 2 MG/ML
INJECTION INTRAMUSCULAR; INTRAVENOUS
Status: DISCONTINUED | OUTPATIENT
Start: 2021-01-29 | End: 2021-01-29

## 2021-01-29 RX ORDER — HYDROCODONE BITARTRATE AND ACETAMINOPHEN 5; 325 MG/1; MG/1
1 TABLET ORAL EVERY 6 HOURS PRN
Status: DISCONTINUED | OUTPATIENT
Start: 2021-01-29 | End: 2021-01-29 | Stop reason: HOSPADM

## 2021-01-29 RX ORDER — PHENYLEPHRINE HYDROCHLORIDE 10 MG/ML
INJECTION INTRAVENOUS
Status: DISCONTINUED | OUTPATIENT
Start: 2021-01-29 | End: 2021-01-29

## 2021-01-29 RX ORDER — LIDOCAINE HYDROCHLORIDE 20 MG/ML
INJECTION, SOLUTION INFILTRATION; PERINEURAL
Status: DISCONTINUED | OUTPATIENT
Start: 2021-01-29 | End: 2021-01-29 | Stop reason: HOSPADM

## 2021-01-29 RX ORDER — CEPHALEXIN 500 MG/1
500 CAPSULE ORAL EVERY 8 HOURS
Qty: 10 CAPSULE | Refills: 0 | Status: SHIPPED | OUTPATIENT
Start: 2021-01-29 | End: 2021-02-02

## 2021-01-29 RX ORDER — HYDROMORPHONE HYDROCHLORIDE 1 MG/ML
0.2 INJECTION, SOLUTION INTRAMUSCULAR; INTRAVENOUS; SUBCUTANEOUS EVERY 5 MIN PRN
Status: DISCONTINUED | OUTPATIENT
Start: 2021-01-29 | End: 2021-01-29 | Stop reason: HOSPADM

## 2021-01-29 RX ORDER — LIDOCAINE HYDROCHLORIDE 20 MG/ML
INJECTION INTRAVENOUS
Status: DISCONTINUED | OUTPATIENT
Start: 2021-01-29 | End: 2021-01-29

## 2021-01-29 RX ORDER — IBUPROFEN 600 MG/1
600 TABLET ORAL EVERY 6 HOURS PRN
Status: DISCONTINUED | OUTPATIENT
Start: 2021-01-29 | End: 2021-01-29 | Stop reason: HOSPADM

## 2021-01-29 RX ORDER — DIPHENHYDRAMINE HYDROCHLORIDE 50 MG/ML
25 INJECTION INTRAMUSCULAR; INTRAVENOUS EVERY 6 HOURS PRN
Status: DISCONTINUED | OUTPATIENT
Start: 2021-01-29 | End: 2021-01-29 | Stop reason: HOSPADM

## 2021-01-29 RX ORDER — MIDAZOLAM HYDROCHLORIDE 1 MG/ML
INJECTION INTRAMUSCULAR; INTRAVENOUS
Status: DISCONTINUED | OUTPATIENT
Start: 2021-01-29 | End: 2021-01-29

## 2021-01-29 RX ORDER — VANCOMYCIN HYDROCHLORIDE 1 G/20ML
INJECTION, POWDER, LYOPHILIZED, FOR SOLUTION INTRAVENOUS
Status: DISCONTINUED | OUTPATIENT
Start: 2021-01-29 | End: 2021-01-29 | Stop reason: HOSPADM

## 2021-01-29 RX ORDER — BUPIVACAINE HYDROCHLORIDE 2.5 MG/ML
INJECTION, SOLUTION EPIDURAL; INFILTRATION; INTRACAUDAL
Status: DISCONTINUED | OUTPATIENT
Start: 2021-01-29 | End: 2021-01-29 | Stop reason: HOSPADM

## 2021-01-29 RX ORDER — HYDROCODONE BITARTRATE AND ACETAMINOPHEN 5; 325 MG/1; MG/1
1 TABLET ORAL EVERY 6 HOURS PRN
Qty: 10 TABLET | Refills: 0 | Status: SHIPPED | OUTPATIENT
Start: 2021-01-29 | End: 2021-02-09

## 2021-01-29 RX ORDER — FENTANYL CITRATE 50 UG/ML
INJECTION, SOLUTION INTRAMUSCULAR; INTRAVENOUS
Status: DISCONTINUED | OUTPATIENT
Start: 2021-01-29 | End: 2021-01-29

## 2021-01-29 RX ORDER — SODIUM CHLORIDE 9 MG/ML
INJECTION, SOLUTION INTRAMUSCULAR; INTRAVENOUS; SUBCUTANEOUS
Status: DISCONTINUED | OUTPATIENT
Start: 2021-01-29 | End: 2021-01-29 | Stop reason: HOSPADM

## 2021-01-29 RX ORDER — PROPOFOL 10 MG/ML
VIAL (ML) INTRAVENOUS CONTINUOUS PRN
Status: DISCONTINUED | OUTPATIENT
Start: 2021-01-29 | End: 2021-01-29

## 2021-01-29 RX ORDER — DEXMEDETOMIDINE HYDROCHLORIDE 100 UG/ML
INJECTION, SOLUTION INTRAVENOUS
Status: DISCONTINUED | OUTPATIENT
Start: 2021-01-29 | End: 2021-01-29

## 2021-01-29 RX ORDER — PROPOFOL 10 MG/ML
VIAL (ML) INTRAVENOUS
Status: DISCONTINUED | OUTPATIENT
Start: 2021-01-29 | End: 2021-01-29

## 2021-01-29 RX ORDER — FENTANYL CITRATE 50 UG/ML
25 INJECTION, SOLUTION INTRAMUSCULAR; INTRAVENOUS EVERY 5 MIN PRN
Status: DISCONTINUED | OUTPATIENT
Start: 2021-01-29 | End: 2021-01-29 | Stop reason: HOSPADM

## 2021-01-29 RX ADMIN — HYDROCODONE BITARTRATE AND ACETAMINOPHEN 1 TABLET: 5; 325 TABLET ORAL at 09:01

## 2021-01-29 RX ADMIN — IBUPROFEN 600 MG: 600 TABLET, FILM COATED ORAL at 12:01

## 2021-01-29 RX ADMIN — CEFAZOLIN 2 G: 330 INJECTION, POWDER, FOR SOLUTION INTRAMUSCULAR; INTRAVENOUS at 07:01

## 2021-01-29 RX ADMIN — PHENYLEPHRINE HYDROCHLORIDE 50 MCG: 10 INJECTION, SOLUTION INTRAMUSCULAR; INTRAVENOUS; SUBCUTANEOUS at 08:01

## 2021-01-29 RX ADMIN — PROPOFOL 50 MG: 10 INJECTION, EMULSION INTRAVENOUS at 08:01

## 2021-01-29 RX ADMIN — PROPOFOL 30 MG: 10 INJECTION, EMULSION INTRAVENOUS at 07:01

## 2021-01-29 RX ADMIN — FENTANYL CITRATE 25 MCG: 50 INJECTION INTRAMUSCULAR; INTRAVENOUS at 08:01

## 2021-01-29 RX ADMIN — PROPOFOL 150 MCG/KG/MIN: 10 INJECTION, EMULSION INTRAVENOUS at 07:01

## 2021-01-29 RX ADMIN — ONDANSETRON 4 MG: 2 INJECTION, SOLUTION INTRAMUSCULAR; INTRAVENOUS at 07:01

## 2021-01-29 RX ADMIN — DEXMEDETOMIDINE HYDROCHLORIDE 8 MCG: 100 INJECTION, SOLUTION, CONCENTRATE INTRAVENOUS at 07:01

## 2021-01-29 RX ADMIN — FENTANYL CITRATE 50 MCG: 50 INJECTION INTRAMUSCULAR; INTRAVENOUS at 07:01

## 2021-01-29 RX ADMIN — FENTANYL CITRATE 25 MCG: 50 INJECTION INTRAMUSCULAR; INTRAVENOUS at 09:01

## 2021-01-29 RX ADMIN — PROPOFOL 20 MG: 10 INJECTION, EMULSION INTRAVENOUS at 08:01

## 2021-01-29 RX ADMIN — SODIUM CHLORIDE: 0.9 INJECTION, SOLUTION INTRAVENOUS at 07:01

## 2021-01-29 RX ADMIN — GLYCOPYRROLATE 0.2 MCG: 0.2 INJECTION, SOLUTION INTRAMUSCULAR; INTRAVITREAL at 07:01

## 2021-01-29 RX ADMIN — DEXMEDETOMIDINE HYDROCHLORIDE 12 MCG: 100 INJECTION, SOLUTION, CONCENTRATE INTRAVENOUS at 07:01

## 2021-01-29 RX ADMIN — MIDAZOLAM HYDROCHLORIDE 2 MG: 1 INJECTION, SOLUTION INTRAMUSCULAR; INTRAVENOUS at 07:01

## 2021-01-29 RX ADMIN — PHENYLEPHRINE HYDROCHLORIDE 100 MCG: 10 INJECTION, SOLUTION INTRAMUSCULAR; INTRAVENOUS; SUBCUTANEOUS at 08:01

## 2021-01-29 RX ADMIN — LIDOCAINE HYDROCHLORIDE 50 MG: 20 INJECTION, SOLUTION INTRAVENOUS at 07:01

## 2021-02-08 ENCOUNTER — HOSPITAL ENCOUNTER (OUTPATIENT)
Dept: PEDIATRIC CARDIOLOGY | Facility: HOSPITAL | Age: 21
Discharge: HOME | End: 2021-02-08
Attending: PEDIATRICS
Payer: COMMERCIAL

## 2021-02-08 DIAGNOSIS — I47.20 SUSTAINED VT (VENTRICULAR TACHYCARDIA): ICD-10-CM

## 2021-02-08 DIAGNOSIS — Z87.898 HISTORY OF SYNCOPE: ICD-10-CM

## 2021-02-08 DIAGNOSIS — Z82.41 FAMILY HISTORY OF SUDDEN CARDIAC DEATH (SCD): ICD-10-CM

## 2021-02-08 PROCEDURE — 93295 DEV INTERROG REMOTE 1/2/MLT: CPT | Mod: ,,, | Performed by: PEDIATRICS

## 2021-02-08 PROCEDURE — 93295 CV ICD REMOTE PEDIATRICS (CUPID ONLY): ICD-10-PCS | Mod: ,,, | Performed by: PEDIATRICS

## 2021-02-08 PROCEDURE — 93296 REM INTERROG EVL PM/IDS: CPT

## 2021-02-09 ENCOUNTER — OFFICE VISIT (OUTPATIENT)
Dept: PEDIATRIC CARDIOLOGY | Facility: CLINIC | Age: 21
End: 2021-02-09
Payer: COMMERCIAL

## 2021-02-09 VITALS
SYSTOLIC BLOOD PRESSURE: 100 MMHG | WEIGHT: 152.75 LBS | RESPIRATION RATE: 18 BRPM | BODY MASS INDEX: 21.39 KG/M2 | HEART RATE: 50 BPM | OXYGEN SATURATION: 97 % | DIASTOLIC BLOOD PRESSURE: 62 MMHG | HEIGHT: 71 IN

## 2021-02-09 DIAGNOSIS — Z95.810 S/P ICD (INTERNAL CARDIAC DEFIBRILLATOR) PROCEDURE: ICD-10-CM

## 2021-02-09 DIAGNOSIS — Z87.898 HISTORY OF SYNCOPE: ICD-10-CM

## 2021-02-09 DIAGNOSIS — I34.0 NONRHEUMATIC MITRAL VALVE REGURGITATION: ICD-10-CM

## 2021-02-09 DIAGNOSIS — I47.20 SUSTAINED VT (VENTRICULAR TACHYCARDIA): Primary | ICD-10-CM

## 2021-02-09 PROCEDURE — 3008F BODY MASS INDEX DOCD: CPT | Mod: CPTII,S$GLB,, | Performed by: NURSE PRACTITIONER

## 2021-02-09 PROCEDURE — 93000 ELECTROCARDIOGRAM COMPLETE: CPT | Mod: S$GLB,,, | Performed by: PEDIATRICS

## 2021-02-09 PROCEDURE — 93000 EKG 12-LEAD: ICD-10-PCS | Mod: S$GLB,,, | Performed by: PEDIATRICS

## 2021-02-09 PROCEDURE — 99214 OFFICE O/P EST MOD 30 MIN: CPT | Mod: 25,S$GLB,, | Performed by: NURSE PRACTITIONER

## 2021-02-09 PROCEDURE — 3008F PR BODY MASS INDEX (BMI) DOCUMENTED: ICD-10-PCS | Mod: CPTII,S$GLB,, | Performed by: NURSE PRACTITIONER

## 2021-02-09 PROCEDURE — 99214 PR OFFICE/OUTPT VISIT, EST, LEVL IV, 30-39 MIN: ICD-10-PCS | Mod: 25,S$GLB,, | Performed by: NURSE PRACTITIONER

## 2021-02-10 ENCOUNTER — PATIENT MESSAGE (OUTPATIENT)
Dept: PEDIATRIC CARDIOLOGY | Facility: CLINIC | Age: 21
End: 2021-02-10

## 2021-02-12 LAB
BATTERY VOLTAGE (V): 3.07 V
ERI (V): 2.73 V
HV IMPEDANCE (OHM): 55 OHM
IMPEDANCE RA LEAD: 418 OHMS
OHS CV DC PP MS1: 0.4 MS
OHS CV DC PP V1: NORMAL V
P/R-WAVE RA LEAD: 7.4 MV
THRESHOLD MS RA LEAD: 0.4 MS
THRESHOLD V RA LEAD: 0.75 V

## 2021-05-14 ENCOUNTER — TELEPHONE (OUTPATIENT)
Dept: PEDIATRIC CARDIOLOGY | Facility: CLINIC | Age: 21
End: 2021-05-14

## 2021-05-17 ENCOUNTER — HOSPITAL ENCOUNTER (OUTPATIENT)
Dept: PEDIATRIC CARDIOLOGY | Facility: HOSPITAL | Age: 21
Discharge: HOME | End: 2021-05-17
Attending: PEDIATRICS
Payer: COMMERCIAL

## 2021-05-17 ENCOUNTER — TELEPHONE (OUTPATIENT)
Dept: PEDIATRIC CARDIOLOGY | Facility: CLINIC | Age: 21
End: 2021-05-17

## 2021-05-17 DIAGNOSIS — Z87.898 HISTORY OF SYNCOPE: ICD-10-CM

## 2021-05-17 DIAGNOSIS — Z82.41 FAMILY HISTORY OF SUDDEN CARDIAC DEATH (SCD): ICD-10-CM

## 2021-05-17 DIAGNOSIS — I47.20 SUSTAINED VT (VENTRICULAR TACHYCARDIA): ICD-10-CM

## 2021-05-17 PROCEDURE — 93296 REM INTERROG EVL PM/IDS: CPT

## 2021-05-17 PROCEDURE — 93295 CV ICD REMOTE PEDIATRICS (CUPID ONLY): ICD-10-PCS | Mod: ,,, | Performed by: PEDIATRICS

## 2021-05-17 PROCEDURE — 93295 DEV INTERROG REMOTE 1/2/MLT: CPT | Mod: ,,, | Performed by: PEDIATRICS

## 2021-05-26 LAB
BATTERY VOLTAGE (V): 3.07 V
ERI (V): 2.73 V
IMPEDANCE RA LEAD: 418 OHMS
OHS CV DC PP MS1: 0.4 MS
OHS CV DC PP V1: NORMAL V
P/R-WAVE RA LEAD: 6.4 MV
THRESHOLD MS RA LEAD: 0.4 MS
THRESHOLD V RA LEAD: 0.62 V

## 2021-08-16 ENCOUNTER — HOSPITAL ENCOUNTER (OUTPATIENT)
Dept: PEDIATRIC CARDIOLOGY | Facility: HOSPITAL | Age: 21
Discharge: HOME | End: 2021-08-16
Attending: PEDIATRICS
Payer: COMMERCIAL

## 2021-08-16 DIAGNOSIS — Z87.898 HISTORY OF SYNCOPE: ICD-10-CM

## 2021-08-16 DIAGNOSIS — Z82.41 FAMILY HISTORY OF SUDDEN CARDIAC DEATH (SCD): ICD-10-CM

## 2021-08-16 DIAGNOSIS — I47.20 SUSTAINED VT (VENTRICULAR TACHYCARDIA): ICD-10-CM

## 2021-08-16 PROCEDURE — 93295 DEV INTERROG REMOTE 1/2/MLT: CPT | Mod: ,,, | Performed by: PEDIATRICS

## 2021-08-16 PROCEDURE — 93296 REM INTERROG EVL PM/IDS: CPT

## 2021-08-16 PROCEDURE — 93295 CV ICD REMOTE PEDIATRICS (CUPID ONLY): ICD-10-PCS | Mod: ,,, | Performed by: PEDIATRICS

## 2021-08-18 LAB
BATTERY VOLTAGE (V): 3.04 V
CHARGE TIME (SEC): 3.7 SEC
ERI (V): 2.73 V
HV IMPEDANCE (OHM): 52 OHM
IMPEDANCE RA LEAD: 399 OHMS
OHS CV DC PP MS1: 0.4 MS
OHS CV DC PP V1: NORMAL V
P/R-WAVE RA LEAD: 5.5 MV
THRESHOLD MS RA LEAD: 0.4 MS
THRESHOLD V RA LEAD: 0.75 V

## 2021-11-22 ENCOUNTER — HOSPITAL ENCOUNTER (OUTPATIENT)
Dept: PEDIATRIC CARDIOLOGY | Facility: HOSPITAL | Age: 21
Discharge: HOME | End: 2021-11-22
Attending: PEDIATRICS
Payer: COMMERCIAL

## 2021-11-22 DIAGNOSIS — I47.20 SUSTAINED VT (VENTRICULAR TACHYCARDIA): ICD-10-CM

## 2021-11-22 DIAGNOSIS — Z82.41 FAMILY HISTORY OF SUDDEN CARDIAC DEATH (SCD): ICD-10-CM

## 2021-11-22 DIAGNOSIS — I47.20 VT (VENTRICULAR TACHYCARDIA): Primary | ICD-10-CM

## 2021-11-22 DIAGNOSIS — Z95.810 S/P ICD (INTERNAL CARDIAC DEFIBRILLATOR) PROCEDURE: ICD-10-CM

## 2021-11-22 DIAGNOSIS — Z87.898 HISTORY OF SYNCOPE: ICD-10-CM

## 2021-11-22 PROCEDURE — 93295 CV ICD REMOTE PEDIATRICS (CUPID ONLY): ICD-10-PCS | Mod: ,,, | Performed by: PEDIATRICS

## 2021-11-22 PROCEDURE — 93295 DEV INTERROG REMOTE 1/2/MLT: CPT | Mod: ,,, | Performed by: PEDIATRICS

## 2021-11-22 PROCEDURE — 93296 REM INTERROG EVL PM/IDS: CPT

## 2021-11-29 LAB
BATTERY VOLTAGE (V): 3.03 V
CHARGE TIME (SEC): 3.7 SEC
ERI (V): 2.73 V
HV IMPEDANCE (OHM): 57 OHM
IMPEDANCE RA LEAD: 418 OHMS
OHS CV DC PP MS1: 0.4 MS
OHS CV DC PP V1: NORMAL V
P/R-WAVE RA LEAD: 7.8 MV
THRESHOLD MS RA LEAD: 0.4 MS
THRESHOLD V RA LEAD: 0.75 V

## 2022-02-21 ENCOUNTER — HOSPITAL ENCOUNTER (OUTPATIENT)
Dept: PEDIATRIC CARDIOLOGY | Facility: HOSPITAL | Age: 22
Discharge: HOME | End: 2022-02-21
Attending: PEDIATRICS
Payer: COMMERCIAL

## 2022-02-21 DIAGNOSIS — Z95.810 S/P ICD (INTERNAL CARDIAC DEFIBRILLATOR) PROCEDURE: ICD-10-CM

## 2022-02-21 DIAGNOSIS — I47.20 VT (VENTRICULAR TACHYCARDIA): ICD-10-CM

## 2022-02-21 DIAGNOSIS — Z82.41 FAMILY HISTORY OF SUDDEN CARDIAC DEATH (SCD): ICD-10-CM

## 2022-02-21 PROCEDURE — 93295 CV ICD REMOTE PEDIATRICS (CUPID ONLY): ICD-10-PCS | Mod: ,,, | Performed by: PEDIATRICS

## 2022-02-21 PROCEDURE — 93295 DEV INTERROG REMOTE 1/2/MLT: CPT | Mod: ,,, | Performed by: PEDIATRICS

## 2022-02-21 PROCEDURE — 93296 REM INTERROG EVL PM/IDS: CPT

## 2022-02-23 DIAGNOSIS — I47.20 SUSTAINED VT (VENTRICULAR TACHYCARDIA): Primary | ICD-10-CM

## 2022-02-23 LAB
BATTERY VOLTAGE (V): 3 V
CHARGE TIME (SEC): 3.6 SEC
ERI (V): 2.73 V
HV IMPEDANCE (OHM): 54 OHM
IMPEDANCE RA LEAD: 418 OHMS
OHS CV DC PP MS1: 0.4 MS
OHS CV DC PP V1: NORMAL V
P/R-WAVE RA LEAD: 6.9 MV
THRESHOLD MS RA LEAD: 0.4 MS
THRESHOLD V RA LEAD: 0.88 V

## 2022-03-15 ENCOUNTER — OFFICE VISIT (OUTPATIENT)
Dept: PEDIATRIC CARDIOLOGY | Facility: CLINIC | Age: 22
End: 2022-03-15
Payer: COMMERCIAL

## 2022-03-15 VITALS
DIASTOLIC BLOOD PRESSURE: 68 MMHG | RESPIRATION RATE: 20 BRPM | HEIGHT: 70 IN | WEIGHT: 157.88 LBS | HEART RATE: 49 BPM | OXYGEN SATURATION: 99 % | BODY MASS INDEX: 22.6 KG/M2 | SYSTOLIC BLOOD PRESSURE: 110 MMHG

## 2022-03-15 DIAGNOSIS — Z95.810 S/P ICD (INTERNAL CARDIAC DEFIBRILLATOR) PROCEDURE: ICD-10-CM

## 2022-03-15 DIAGNOSIS — Z87.898 HISTORY OF SYNCOPE: ICD-10-CM

## 2022-03-15 DIAGNOSIS — I47.20 SUSTAINED VT (VENTRICULAR TACHYCARDIA): ICD-10-CM

## 2022-03-15 PROCEDURE — 93000 EKG 12-LEAD: ICD-10-PCS | Mod: S$GLB,,, | Performed by: PEDIATRICS

## 2022-03-15 PROCEDURE — 1160F RVW MEDS BY RX/DR IN RCRD: CPT | Mod: CPTII,S$GLB,, | Performed by: NURSE PRACTITIONER

## 2022-03-15 PROCEDURE — 3008F PR BODY MASS INDEX (BMI) DOCUMENTED: ICD-10-PCS | Mod: CPTII,S$GLB,, | Performed by: NURSE PRACTITIONER

## 2022-03-15 PROCEDURE — 1159F MED LIST DOCD IN RCRD: CPT | Mod: CPTII,S$GLB,, | Performed by: NURSE PRACTITIONER

## 2022-03-15 PROCEDURE — 99212 OFFICE O/P EST SF 10 MIN: CPT | Mod: 25,S$GLB,, | Performed by: NURSE PRACTITIONER

## 2022-03-15 PROCEDURE — 3008F BODY MASS INDEX DOCD: CPT | Mod: CPTII,S$GLB,, | Performed by: NURSE PRACTITIONER

## 2022-03-15 PROCEDURE — 1159F PR MEDICATION LIST DOCUMENTED IN MEDICAL RECORD: ICD-10-PCS | Mod: CPTII,S$GLB,, | Performed by: NURSE PRACTITIONER

## 2022-03-15 PROCEDURE — 3078F DIAST BP <80 MM HG: CPT | Mod: CPTII,S$GLB,, | Performed by: NURSE PRACTITIONER

## 2022-03-15 PROCEDURE — 1160F PR REVIEW ALL MEDS BY PRESCRIBER/CLIN PHARMACIST DOCUMENTED: ICD-10-PCS | Mod: CPTII,S$GLB,, | Performed by: NURSE PRACTITIONER

## 2022-03-15 PROCEDURE — 93000 ELECTROCARDIOGRAM COMPLETE: CPT | Mod: S$GLB,,, | Performed by: PEDIATRICS

## 2022-03-15 PROCEDURE — 99212 PR OFFICE/OUTPT VISIT, EST, LEVL II, 10-19 MIN: ICD-10-PCS | Mod: 25,S$GLB,, | Performed by: NURSE PRACTITIONER

## 2022-03-15 PROCEDURE — 3074F PR MOST RECENT SYSTOLIC BLOOD PRESSURE < 130 MM HG: ICD-10-PCS | Mod: CPTII,S$GLB,, | Performed by: NURSE PRACTITIONER

## 2022-03-15 PROCEDURE — 3078F PR MOST RECENT DIASTOLIC BLOOD PRESSURE < 80 MM HG: ICD-10-PCS | Mod: CPTII,S$GLB,, | Performed by: NURSE PRACTITIONER

## 2022-03-15 PROCEDURE — 3074F SYST BP LT 130 MM HG: CPT | Mod: CPTII,S$GLB,, | Performed by: NURSE PRACTITIONER
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
## 2022-03-15 NOTE — PATIENT INSTRUCTIONS
Michael Valerio MD  Pediatric Cardiology  [address] [place] [address]  [phone]    General Guidelines    Name: Pablo Zamudio                   : 2000    Diagnosis:   1. Sustained VT (ventricular tachycardia)    2. S/P ICD (internal cardiac defibrillator) procedure    3. History of syncope        PCP: [facility]  PCP Phone Number: [phone]    If you have an emergency or you think you have an emergency, go to the nearest emergency room!     Breathing too fast, doesnt look right, consistently not eating well, your child needs to be checked. These are general indications that your child is not feeling well. This may be caused by anything, a stomach virus, an ear ache or heart disease, so please call [facility]. If [facility] thinks you need to be checked for your heart, they will let us know.     If your child experiences a rapid or very slow heart rate and has the following symptoms, call [facility] or go to the nearest emergency room.   unexplained chest pain   does not look right   feels like they are going to pass out   actually passes out for unexplained reasons   weakness or fatigue   shortness of breath  or breathing fast   consistent poor feeding     If your child experiences a rapid or very slow heart rate that lasts longer than 30 minutes call [facility] or go to the nearest emergency room.     If your child feels like they are going to pass out - have them sit down or lay down immediately. Raise the feet above the head (prop the feet on a chair or the wall) until the feeling passes. Slowly allow the child to sit, then stand. If the feeling returns, lay back down and start over.     It is very important that you notify [facility] first. [facility] or the ER Physician can reach Dr. Michael Valerio at the office or through [facility] at [phone] as  needed.    Call our office ([phone]) one week after ALL tests for results.     PREVENTION OF BACTERIAL ENDOCARDITIS    A COPY OF THIS SHEET MUST BE GIVEN TO ALL OF YOUR DOCTORS OR HEALTH CARE PROVIDERS    You have received this information because you are at an increased risk for developing adverse outcomes from bacterial endocarditis or infective endocarditis.     Patient Name:  Pablo Zamudio     : 2000   Diagnosis:   1. Sustained VT (ventricular tachycardia)    2. S/P ICD (internal cardiac defibrillator) procedure    3. History of syncope        As of 3/15/2022, Dr. Michael Valerio, Pediatric Cardiologist recommends that Pablo receive COMPLETE USE of antibiotic prophylaxis from bacterial endocarditis because of an existing heart condition.   Complete use antibiotic prophylaxis with dental or surgical procedures is recommended only for patients with cardiac conditions associated with the highest risk of adverse outcomes from endocarditis, includin) Artificial heart valve; 2) A history of endocarditis infection; 3) A cardiac transplantation recipients with cardiac valvular disease; 4) Certain serious congenital heart conditions including a) Unrepaired/incompletely repaired cyanotic heart disease (including shunts & conduits); b) A completely repaired congenital heart defect with prosthetic material or device for the first six months after the procedure; c) Any repaired congenital heart defect with residual defect at the site or adjacent to the site of a prosthetic patch or prosthetic device (which inhibit endothelialization).    Dental procedures for which prophylaxis is recommended in patients with the cardiac conditions are: 1) All dental procedures that involve manipulation of gingival tissue or the periapical region of teeth or; 2) perforation of the oral mucosa.  Antibiotic prophylaxis is NOT recommended for the following dental procedures or events: routine anesthetic injections through non-infected  tissue; taking dental radiographs; placement of removable prosthodontic or orthodontic appliances; adjustment of orthodontic appliances; placement of orthodontic brackets; and shedding of deciduous teeth or bleeding from trauma to the lips or oral mucosa.    Antibiotic Prophylactic Regimens Recommended for Dental Procedures  ---Regimen - Single Dose 30-60 minutes before Procedure---  Situation Agent Adults Children   Oral Amoxicillin 2g 50/mg/kg   Unable to take oral meds Ampicillin   OR  Cefazolin or ceftriaxone 2 g IM or IV1    1 g IM or IV 50 mg/kg IM or IV    50 mg/kg IM or IV   Allergic to Penicillins   or   ampicillin-    Oral regimen Cephalexin 2  OR  Clindamycin  OR  Azithromycin or clarithromycin 2 g    600 mg    500 mg 50 mg/kg    20 mg/kg    15 mg/kg   Allergic to penicillin or ampicillin and unable to take oral medications Cefazolin or ceftriaxone 3  OR  Clindamycin 1 g IM or IV      600 mg IM or IV 50 mg/kg IM or IV      20 mg/kg IM or IV   1IM - intramuscular; IV - intravenous                               2Or other first or second generation oral cephalosporin in equivalent adult or pediatric dosage.  3Cephalosporins should not be used in an individual with a history of anaphylaxis, angioedema or urticaria with penicillin or ampicillin.   Adapted from Prevention of Infective Endocarditis: Guidelines From the American Heart Association, by the Committee on Rheumatic Fever, Endocarditis, and Kawasaki Disease. Circulation, e-published April 19, 2007. Go to www.americanheart.org/presenter for more information.  The practice of giving patients antibiotics prior to a dental procedure is no longer recommended EXCEPT for patients with the highest risk of adverse outcomes resulting from bacterial endocarditis. We cannot exclude the possibility that an exceedingly small number of cases, if any, of bacterial endocarditis may be prevented by antibiotic prophylaxis prior to a dental procedure.The importance of  good oral and dental health and regular visits to the dentist is important for patients at risk for bacterial endocarditis.  Gastrointestinal (GI)/Genitourinary () Procedures: Antibiotic prophylaxis solely to prevent bacterial endocarditis is no longer recommended for patients who undergo a Gl or  tract procedure, including patients with the highest risk of adverse outcomes due to bacterial endocarditis.

## 2022-04-21 ENCOUNTER — DOCUMENTATION ONLY (OUTPATIENT)
Dept: PEDIATRIC CARDIOLOGY | Facility: CLINIC | Age: 22
End: 2022-04-21
Payer: COMMERCIAL
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
## 2022-05-10 DIAGNOSIS — I47.20 SUSTAINED VT (VENTRICULAR TACHYCARDIA): ICD-10-CM

## 2022-05-10 DIAGNOSIS — I34.0 MITRAL VALVE INSUFFICIENCY, UNSPECIFIED ETIOLOGY: ICD-10-CM

## 2022-05-10 DIAGNOSIS — Z95.810 S/P ICD (INTERNAL CARDIAC DEFIBRILLATOR) PROCEDURE: ICD-10-CM

## 2022-05-10 DIAGNOSIS — Z87.898 HISTORY OF SYNCOPE: ICD-10-CM

## 2022-05-10 RX ORDER — NADOLOL 20 MG/1
10 TABLET ORAL DAILY
Qty: 45 TABLET | Refills: 3 | Status: SHIPPED | OUTPATIENT
Start: 2022-05-10 | End: 2023-05-08 | Stop reason: SDUPTHER

## 2022-05-23 ENCOUNTER — PATIENT MESSAGE (OUTPATIENT)
Dept: PEDIATRIC CARDIOLOGY | Facility: CLINIC | Age: 22
End: 2022-05-23
Payer: COMMERCIAL

## 2022-06-02 DIAGNOSIS — I47.20 SUSTAINED VT (VENTRICULAR TACHYCARDIA): ICD-10-CM

## 2022-06-02 DIAGNOSIS — Z87.898 HISTORY OF SYNCOPE: ICD-10-CM

## 2022-06-02 DIAGNOSIS — Z82.41 FAMILY HISTORY OF SUDDEN CARDIAC DEATH (SCD): Primary | ICD-10-CM

## 2022-06-06 ENCOUNTER — HOSPITAL ENCOUNTER (OUTPATIENT)
Dept: PEDIATRIC CARDIOLOGY | Facility: HOSPITAL | Age: 22
Discharge: HOME | End: 2022-06-06
Attending: PEDIATRICS
Payer: COMMERCIAL

## 2022-06-06 DIAGNOSIS — I47.20 SUSTAINED VT (VENTRICULAR TACHYCARDIA): ICD-10-CM

## 2022-06-06 DIAGNOSIS — Z87.898 HISTORY OF SYNCOPE: ICD-10-CM

## 2022-06-06 DIAGNOSIS — I47.20 SUSTAINED VT (VENTRICULAR TACHYCARDIA): Primary | ICD-10-CM

## 2022-06-06 DIAGNOSIS — Z95.810 S/P ICD (INTERNAL CARDIAC DEFIBRILLATOR) PROCEDURE: ICD-10-CM

## 2022-06-06 DIAGNOSIS — Z82.41 FAMILY HISTORY OF SUDDEN CARDIAC DEATH (SCD): ICD-10-CM

## 2022-06-06 DIAGNOSIS — I47.20 VT (VENTRICULAR TACHYCARDIA): ICD-10-CM

## 2022-06-06 PROCEDURE — 93295 CV ICD REMOTE PEDIATRICS (CUPID ONLY): ICD-10-PCS | Mod: ,,, | Performed by: PEDIATRICS

## 2022-06-06 PROCEDURE — 93295 DEV INTERROG REMOTE 1/2/MLT: CPT | Mod: ,,, | Performed by: PEDIATRICS

## 2022-06-06 PROCEDURE — 93296 REM INTERROG EVL PM/IDS: CPT

## 2022-06-07 LAB
BATTERY VOLTAGE (V): 3.01 V
CHARGE TIME (SEC): 3.6 SEC
ERI (V): 2.73 V
HV IMPEDANCE (OHM): 56 OHM
IMPEDANCE RA LEAD: 399 OHMS
OHS CV DC PP MS1: 0.4 MS
OHS CV DC PP V1: NORMAL V
P/R-WAVE RA LEAD: 7.1 MV
THRESHOLD MS RA LEAD: 0.4 MS
THRESHOLD V RA LEAD: 0.62 V

## 2022-09-02 ENCOUNTER — PATIENT MESSAGE (OUTPATIENT)
Dept: PEDIATRIC CARDIOLOGY | Facility: CLINIC | Age: 22
End: 2022-09-02
Payer: COMMERCIAL

## 2022-09-12 ENCOUNTER — HOSPITAL ENCOUNTER (OUTPATIENT)
Dept: PEDIATRIC CARDIOLOGY | Facility: HOSPITAL | Age: 22
Discharge: HOME | End: 2022-09-12
Attending: PEDIATRICS
Payer: COMMERCIAL

## 2022-09-12 DIAGNOSIS — I47.20 VT (VENTRICULAR TACHYCARDIA): ICD-10-CM

## 2022-09-12 DIAGNOSIS — I47.20 SUSTAINED VT (VENTRICULAR TACHYCARDIA): ICD-10-CM

## 2022-09-12 DIAGNOSIS — Z87.898 HISTORY OF SYNCOPE: ICD-10-CM

## 2022-09-12 DIAGNOSIS — Z95.810 S/P ICD (INTERNAL CARDIAC DEFIBRILLATOR) PROCEDURE: ICD-10-CM

## 2022-09-12 PROCEDURE — 93295 CV ICD REMOTE PEDIATRICS (CUPID ONLY): ICD-10-PCS | Mod: ,,, | Performed by: PEDIATRICS

## 2022-09-12 PROCEDURE — 93296 REM INTERROG EVL PM/IDS: CPT

## 2022-09-12 PROCEDURE — 93295 DEV INTERROG REMOTE 1/2/MLT: CPT | Mod: ,,, | Performed by: PEDIATRICS

## 2022-09-25 LAB
BATTERY VOLTAGE (V): 3.02 V
CHARGE TIME (SEC): 3.6 SEC
ERI (V): 2.73 V
HV IMPEDANCE (OHM): 57 OHM
IMPEDANCE RA LEAD: 361 OHMS
OHS CV DC PP MS1: 0.4 MS
OHS CV DC PP V1: NORMAL V
P/R-WAVE RA LEAD: 6.6 MV
THRESHOLD MS RA LEAD: 0.4 MS
THRESHOLD V RA LEAD: 0.88 V

## 2022-12-09 ENCOUNTER — PATIENT MESSAGE (OUTPATIENT)
Dept: PEDIATRIC CARDIOLOGY | Facility: CLINIC | Age: 22
End: 2022-12-09
Payer: COMMERCIAL

## 2022-12-30 ENCOUNTER — PATIENT MESSAGE (OUTPATIENT)
Dept: PEDIATRIC CARDIOLOGY | Facility: CLINIC | Age: 22
End: 2022-12-30
Payer: COMMERCIAL

## 2023-01-27 ENCOUNTER — PATIENT MESSAGE (OUTPATIENT)
Dept: PEDIATRIC CARDIOLOGY | Facility: CLINIC | Age: 23
End: 2023-01-27
Payer: COMMERCIAL

## 2023-01-30 ENCOUNTER — HOSPITAL ENCOUNTER (OUTPATIENT)
Dept: PEDIATRIC CARDIOLOGY | Facility: HOSPITAL | Age: 23
Discharge: HOME | End: 2023-01-30
Attending: PEDIATRICS
Payer: COMMERCIAL

## 2023-01-30 ENCOUNTER — TELEPHONE (OUTPATIENT)
Dept: PEDIATRIC CARDIOLOGY | Facility: CLINIC | Age: 23
End: 2023-01-30
Payer: COMMERCIAL

## 2023-01-30 DIAGNOSIS — I47.20 SUSTAINED VT (VENTRICULAR TACHYCARDIA): ICD-10-CM

## 2023-01-30 DIAGNOSIS — Z95.810 S/P ICD (INTERNAL CARDIAC DEFIBRILLATOR) PROCEDURE: ICD-10-CM

## 2023-01-30 DIAGNOSIS — Z87.898 HISTORY OF SYNCOPE: ICD-10-CM

## 2023-01-30 DIAGNOSIS — I47.20 VT (VENTRICULAR TACHYCARDIA): ICD-10-CM

## 2023-01-30 PROCEDURE — 93295 DEV INTERROG REMOTE 1/2/MLT: CPT | Mod: ,,, | Performed by: PEDIATRICS

## 2023-01-30 PROCEDURE — 93295 CV ICD REMOTE PEDIATRICS (CUPID ONLY): ICD-10-PCS | Mod: ,,, | Performed by: PEDIATRICS

## 2023-01-30 PROCEDURE — 93296 REM INTERROG EVL PM/IDS: CPT

## 2023-03-11 LAB
CHARGE TIME (SEC): 3.6 SEC
HV IMPEDANCE (OHM): 62 OHM
IMPEDANCE RA LEAD: 456 OHMS
OHS CV DC PP MS1: 0.4 MS
OHS CV DC PP V1: NORMAL V
P/R-WAVE RA LEAD: 7.3 MV
THRESHOLD MS RA LEAD: 0.4 MS
THRESHOLD V RA LEAD: 0.88 V

## 2023-05-04 ENCOUNTER — PATIENT MESSAGE (OUTPATIENT)
Dept: PEDIATRIC CARDIOLOGY | Facility: CLINIC | Age: 23
End: 2023-05-04
Payer: COMMERCIAL

## 2023-05-08 DIAGNOSIS — I34.0 MITRAL VALVE INSUFFICIENCY, UNSPECIFIED ETIOLOGY: ICD-10-CM

## 2023-05-08 DIAGNOSIS — I47.20 SUSTAINED VT (VENTRICULAR TACHYCARDIA): ICD-10-CM

## 2023-05-08 DIAGNOSIS — Z95.810 S/P ICD (INTERNAL CARDIAC DEFIBRILLATOR) PROCEDURE: ICD-10-CM

## 2023-05-08 DIAGNOSIS — Z87.898 HISTORY OF SYNCOPE: ICD-10-CM

## 2023-05-08 RX ORDER — NADOLOL 20 MG/1
10 TABLET ORAL DAILY
Qty: 15 TABLET | Refills: 1 | Status: SHIPPED | OUTPATIENT
Start: 2023-05-08 | End: 2023-06-08

## 2023-05-15 ENCOUNTER — HOSPITAL ENCOUNTER (OUTPATIENT)
Dept: PEDIATRIC CARDIOLOGY | Facility: HOSPITAL | Age: 23
Discharge: HOME | End: 2023-05-15
Attending: PEDIATRICS
Payer: COMMERCIAL

## 2023-05-15 DIAGNOSIS — Z95.810 S/P ICD (INTERNAL CARDIAC DEFIBRILLATOR) PROCEDURE: ICD-10-CM

## 2023-05-15 DIAGNOSIS — I47.20 VT (VENTRICULAR TACHYCARDIA): ICD-10-CM

## 2023-05-15 DIAGNOSIS — I47.20 SUSTAINED VT (VENTRICULAR TACHYCARDIA): ICD-10-CM

## 2023-05-15 DIAGNOSIS — Z82.41 FAMILY HISTORY OF SUDDEN CARDIAC DEATH (SCD): Primary | ICD-10-CM

## 2023-05-15 DIAGNOSIS — Z87.898 HISTORY OF SYNCOPE: ICD-10-CM

## 2023-05-15 PROCEDURE — 93295 CV ICD REMOTE PEDIATRICS (CUPID ONLY): ICD-10-PCS | Mod: ,,, | Performed by: PEDIATRICS

## 2023-05-15 PROCEDURE — 93295 DEV INTERROG REMOTE 1/2/MLT: CPT | Mod: ,,, | Performed by: PEDIATRICS

## 2023-05-15 PROCEDURE — 93296 REM INTERROG EVL PM/IDS: CPT

## 2023-05-17 DIAGNOSIS — Z87.898 HISTORY OF SYNCOPE: ICD-10-CM

## 2023-05-17 DIAGNOSIS — Z95.810 S/P ICD (INTERNAL CARDIAC DEFIBRILLATOR) PROCEDURE: ICD-10-CM

## 2023-05-17 DIAGNOSIS — I47.20 SUSTAINED VT (VENTRICULAR TACHYCARDIA): Primary | ICD-10-CM

## 2023-05-30 LAB
BATTERY VOLTAGE (V): 3.01 V
CHARGE TIME (SEC): 3.6 SEC
ERI (V): 2.73 V
HV IMPEDANCE (OHM): 70 OHM
IMPEDANCE RA LEAD: 418 OHMS
OHS CV DC PP MS1: 0.4 MS
OHS CV DC PP V1: NORMAL V
P/R-WAVE RA LEAD: 6.5 MV
THRESHOLD MS RA LEAD: 0.4 MS
THRESHOLD V RA LEAD: 1 V

## 2023-06-08 ENCOUNTER — OFFICE VISIT (OUTPATIENT)
Dept: PEDIATRIC CARDIOLOGY | Facility: CLINIC | Age: 23
End: 2023-06-08
Payer: COMMERCIAL

## 2023-06-08 VITALS
HEART RATE: 58 BPM | HEIGHT: 70 IN | WEIGHT: 170.44 LBS | OXYGEN SATURATION: 98 % | SYSTOLIC BLOOD PRESSURE: 108 MMHG | BODY MASS INDEX: 24.4 KG/M2 | RESPIRATION RATE: 18 BRPM | DIASTOLIC BLOOD PRESSURE: 76 MMHG

## 2023-06-08 DIAGNOSIS — I47.20 SUSTAINED VT (VENTRICULAR TACHYCARDIA): ICD-10-CM

## 2023-06-08 DIAGNOSIS — Z87.898 HISTORY OF SYNCOPE: ICD-10-CM

## 2023-06-08 DIAGNOSIS — Z95.810 S/P ICD (INTERNAL CARDIAC DEFIBRILLATOR) PROCEDURE: ICD-10-CM

## 2023-06-08 DIAGNOSIS — I34.0 MITRAL VALVE INSUFFICIENCY, UNSPECIFIED ETIOLOGY: ICD-10-CM

## 2023-06-08 PROCEDURE — 3078F PR MOST RECENT DIASTOLIC BLOOD PRESSURE < 80 MM HG: ICD-10-PCS | Mod: CPTII,S$GLB,, | Performed by: NURSE PRACTITIONER

## 2023-06-08 PROCEDURE — 99214 OFFICE O/P EST MOD 30 MIN: CPT | Mod: 25,S$GLB,, | Performed by: NURSE PRACTITIONER

## 2023-06-08 PROCEDURE — 93000 EKG 12-LEAD: ICD-10-PCS | Mod: S$GLB,,, | Performed by: PEDIATRICS

## 2023-06-08 PROCEDURE — 3078F DIAST BP <80 MM HG: CPT | Mod: CPTII,S$GLB,, | Performed by: NURSE PRACTITIONER

## 2023-06-08 PROCEDURE — 3008F PR BODY MASS INDEX (BMI) DOCUMENTED: ICD-10-PCS | Mod: CPTII,S$GLB,, | Performed by: NURSE PRACTITIONER

## 2023-06-08 PROCEDURE — 3074F SYST BP LT 130 MM HG: CPT | Mod: CPTII,S$GLB,, | Performed by: NURSE PRACTITIONER

## 2023-06-08 PROCEDURE — 3008F BODY MASS INDEX DOCD: CPT | Mod: CPTII,S$GLB,, | Performed by: NURSE PRACTITIONER

## 2023-06-08 PROCEDURE — 1160F PR REVIEW ALL MEDS BY PRESCRIBER/CLIN PHARMACIST DOCUMENTED: ICD-10-PCS | Mod: CPTII,S$GLB,, | Performed by: NURSE PRACTITIONER

## 2023-06-08 PROCEDURE — 3074F PR MOST RECENT SYSTOLIC BLOOD PRESSURE < 130 MM HG: ICD-10-PCS | Mod: CPTII,S$GLB,, | Performed by: NURSE PRACTITIONER

## 2023-06-08 PROCEDURE — 1159F MED LIST DOCD IN RCRD: CPT | Mod: CPTII,S$GLB,, | Performed by: NURSE PRACTITIONER

## 2023-06-08 PROCEDURE — 99214 PR OFFICE/OUTPT VISIT, EST, LEVL IV, 30-39 MIN: ICD-10-PCS | Mod: 25,S$GLB,, | Performed by: NURSE PRACTITIONER

## 2023-06-08 PROCEDURE — 93000 ELECTROCARDIOGRAM COMPLETE: CPT | Mod: S$GLB,,, | Performed by: PEDIATRICS

## 2023-06-08 PROCEDURE — 1159F PR MEDICATION LIST DOCUMENTED IN MEDICAL RECORD: ICD-10-PCS | Mod: CPTII,S$GLB,, | Performed by: NURSE PRACTITIONER

## 2023-06-08 PROCEDURE — 1160F RVW MEDS BY RX/DR IN RCRD: CPT | Mod: CPTII,S$GLB,, | Performed by: NURSE PRACTITIONER

## 2023-06-08 RX ORDER — NADOLOL 20 MG/1
10 TABLET ORAL DAILY
Qty: 45 TABLET | Refills: 3 | Status: SHIPPED | OUTPATIENT
Start: 2023-06-08

## 2023-06-08 NOTE — PATIENT INSTRUCTIONS
Michael Valerio MD  Pediatric Cardiology  [address] [place] [address]  [phone]    General Guidelines    Name: Pablo Zamudio                   : 2000    Diagnosis:   1. Sustained VT (ventricular tachycardia)    2. S/P ICD (internal cardiac defibrillator) procedure    3. History of syncope    4. Mitral valve insufficiency, unspecified etiology        PCP: [facility]  PCP Phone Number: [phone]    If you have an emergency or you think you have an emergency, go to the nearest emergency room!     Breathing too fast, doesnt look right, consistently not eating well, your child needs to be checked. These are general indications that your child is not feeling well. This may be caused by anything, a stomach virus, an ear ache or heart disease, so please call [facility]. If [facility] thinks you need to be checked for your heart, they will let us know.     If your child experiences a rapid or very slow heart rate and has the following symptoms, call [facility] or go to the nearest emergency room.   unexplained chest pain   does not look right   feels like they are going to pass out   actually passes out for unexplained reasons   weakness or fatigue   shortness of breath  or breathing fast   consistent poor feeding     If your child experiences a rapid or very slow heart rate that lasts longer than 30 minutes call [facility] or go to the nearest emergency room.     If your child feels like they are going to pass out - have them sit down or lay down immediately. Raise the feet above the head (prop the feet on a chair or the wall) until the feeling passes. Slowly allow the child to sit, then stand. If the feeling returns, lay back down and start over.     It is very important that you notify [facility] first. [facility] or the ER Physician can reach Dr. Michael Valerio at the office or through  [facility] at [phone] as needed.    Call our office ([phone]) one week after ALL tests for results.     PREVENTION OF BACTERIAL ENDOCARDITIS    A COPY OF THIS SHEET MUST BE GIVEN TO ALL OF YOUR DOCTORS OR HEALTH CARE PROVIDERS    You have received this information because you are at an increased risk for developing adverse outcomes from bacterial endocarditis or infective endocarditis.     Patient Name:  Pablo Zamudio     : 2000   Diagnosis:   1. Sustained VT (ventricular tachycardia)    2. S/P ICD (internal cardiac defibrillator) procedure    3. History of syncope    4. Mitral valve insufficiency, unspecified etiology        As of 2023, Dr. Michael Valerio, Pediatric Cardiologist recommends that Pablo receive COMPLETE USE of antibiotic prophylaxis from bacterial endocarditis because of an existing heart condition.   Complete use antibiotic prophylaxis with dental or surgical procedures is recommended only for patients with cardiac conditions associated with the highest risk of adverse outcomes from endocarditis, includin) Artificial heart valve; 2) A history of endocarditis infection; 3) A cardiac transplantation recipients with cardiac valvular disease; 4) Certain serious congenital heart conditions including a) Unrepaired/incompletely repaired cyanotic heart disease (including shunts & conduits); b) A completely repaired congenital heart defect with prosthetic material or device for the first six months after the procedure; c) Any repaired congenital heart defect with residual defect at the site or adjacent to the site of a prosthetic patch or prosthetic device (which inhibit endothelialization).    Dental procedures for which prophylaxis is recommended in patients with the cardiac conditions are: 1) All dental procedures that involve manipulation of gingival tissue or the periapical region of teeth or; 2) perforation of the oral mucosa.  Antibiotic prophylaxis is NOT  recommended for the following dental procedures or events: routine anesthetic injections through non-infected tissue; taking dental radiographs; placement of removable prosthodontic or orthodontic appliances; adjustment of orthodontic appliances; placement of orthodontic brackets; and shedding of deciduous teeth or bleeding from trauma to the lips or oral mucosa.    Antibiotic Prophylactic Regimens Recommended for Dental Procedures  ---Regimen - Single Dose 30-60 minutes before Procedure---  Situation Agent Adults Children   Oral Amoxicillin 2g 50/mg/kg   Unable to take oral meds Ampicillin   OR  Cefazolin or ceftriaxone 2 g IM or IV1    1 g IM or IV 50 mg/kg IM or IV    50 mg/kg IM or IV   Allergic to Penicillins   or   ampicillin-    Oral regimen Cephalexin 2  OR  Clindamycin  OR  Azithromycin or clarithromycin 2 g    600 mg    500 mg 50 mg/kg    20 mg/kg    15 mg/kg   Allergic to penicillin or ampicillin and unable to take oral medications Cefazolin or ceftriaxone 3  OR  Clindamycin 1 g IM or IV      600 mg IM or IV 50 mg/kg IM or IV      20 mg/kg IM or IV   1IM - intramuscular; IV - intravenous                               2Or other first or second generation oral cephalosporin in equivalent adult or pediatric dosage.  3Cephalosporins should not be used in an individual with a history of anaphylaxis, angioedema or urticaria with penicillin or ampicillin.   Adapted from Prevention of Infective Endocarditis: Guidelines From the American Heart Association, by the Committee on Rheumatic Fever, Endocarditis, and Kawasaki Disease. Circulation, e-published April 19, 2007. Go to www.americanheart.org/presenter for more information.  The practice of giving patients antibiotics prior to a dental procedure is no longer recommended EXCEPT for patients with the highest risk of adverse outcomes resulting from bacterial endocarditis. We cannot exclude the possibility that an exceedingly small number of cases, if any, of  bacterial endocarditis may be prevented by antibiotic prophylaxis prior to a dental procedure.The importance of good oral and dental health and regular visits to the dentist is important for patients at risk for bacterial endocarditis.  Gastrointestinal (GI)/Genitourinary () Procedures: Antibiotic prophylaxis solely to prevent bacterial endocarditis is no longer recommended for patients who undergo a Gl or  tract procedure, including patients with the highest risk of adverse outcomes due to bacterial endocarditis.
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
## 2023-08-11 ENCOUNTER — PATIENT MESSAGE (OUTPATIENT)
Dept: PEDIATRIC CARDIOLOGY | Facility: CLINIC | Age: 23
End: 2023-08-11
Payer: COMMERCIAL

## 2023-08-16 ENCOUNTER — PATIENT MESSAGE (OUTPATIENT)
Dept: PEDIATRIC CARDIOLOGY | Facility: CLINIC | Age: 23
End: 2023-08-16
Payer: COMMERCIAL

## 2023-08-21 ENCOUNTER — HOSPITAL ENCOUNTER (OUTPATIENT)
Dept: PEDIATRIC CARDIOLOGY | Facility: HOSPITAL | Age: 23
Discharge: HOME | End: 2023-08-21
Attending: PEDIATRICS
Payer: COMMERCIAL

## 2023-08-21 DIAGNOSIS — I47.20 SUSTAINED VT (VENTRICULAR TACHYCARDIA): ICD-10-CM

## 2023-08-21 DIAGNOSIS — Z95.810 S/P ICD (INTERNAL CARDIAC DEFIBRILLATOR) PROCEDURE: ICD-10-CM

## 2023-08-21 DIAGNOSIS — Z82.41 FAMILY HISTORY OF SUDDEN CARDIAC DEATH (SCD): ICD-10-CM

## 2023-08-21 DIAGNOSIS — I47.20 VT (VENTRICULAR TACHYCARDIA): ICD-10-CM

## 2023-08-21 PROCEDURE — 93295 DEV INTERROG REMOTE 1/2/MLT: CPT | Mod: ,,, | Performed by: PEDIATRICS

## 2023-08-21 PROCEDURE — 93296 REM INTERROG EVL PM/IDS: CPT

## 2023-08-21 PROCEDURE — 93295 CV ICD REMOTE PEDIATRICS (CUPID ONLY): ICD-10-PCS | Mod: ,,, | Performed by: PEDIATRICS

## 2023-09-26 LAB
BATTERY VOLTAGE (V): 3 V
ERI (V): 2.73 V
HV IMPEDANCE (OHM): 77 OHM
IMPEDANCE RA LEAD: 418 OHMS
OHS CV DC PP MS1: 0.4 MS
OHS CV DC PP V1: NORMAL V
P/R-WAVE RA LEAD: 9.6 MV
THRESHOLD MS RA LEAD: 0.4 MS
THRESHOLD V RA LEAD: 0.75 V

## 2023-10-06 ENCOUNTER — DOCUMENTATION ONLY (OUTPATIENT)
Dept: PEDIATRIC CARDIOLOGY | Facility: CLINIC | Age: 23
End: 2023-10-06
Payer: COMMERCIAL

## 2024-05-31 DIAGNOSIS — Z95.810 S/P ICD (INTERNAL CARDIAC DEFIBRILLATOR) PROCEDURE: ICD-10-CM

## 2024-05-31 DIAGNOSIS — Z87.898 HISTORY OF SYNCOPE: ICD-10-CM

## 2024-05-31 DIAGNOSIS — I34.0 MITRAL VALVE INSUFFICIENCY, UNSPECIFIED ETIOLOGY: ICD-10-CM

## 2024-05-31 DIAGNOSIS — I47.20 SUSTAINED VT (VENTRICULAR TACHYCARDIA): ICD-10-CM

## 2024-05-31 RX ORDER — NADOLOL 20 MG/1
10 TABLET ORAL DAILY
Qty: 45 TABLET | Refills: 3 | Status: SHIPPED | OUTPATIENT
Start: 2024-05-31

## 2025-02-22 DIAGNOSIS — I47.20 SUSTAINED VT (VENTRICULAR TACHYCARDIA): ICD-10-CM

## 2025-02-22 DIAGNOSIS — Z87.898 HISTORY OF SYNCOPE: ICD-10-CM

## 2025-02-22 DIAGNOSIS — I34.0 MITRAL VALVE INSUFFICIENCY, UNSPECIFIED ETIOLOGY: ICD-10-CM

## 2025-02-22 DIAGNOSIS — Z95.810 S/P ICD (INTERNAL CARDIAC DEFIBRILLATOR) PROCEDURE: ICD-10-CM

## 2025-03-03 RX ORDER — NADOLOL 20 MG/1
10 TABLET ORAL DAILY
Qty: 45 TABLET | Refills: 3 | Status: SHIPPED | OUTPATIENT
Start: 2025-03-03

## (undated) DEVICE — BLADE PLASMA WIDE SPATULA TIP

## (undated) DEVICE — PACK PACER PERMANENT

## (undated) DEVICE — Device

## (undated) DEVICE — KIT WRENCH

## (undated) DEVICE — ELECTRODE REM PLYHSV RETURN 9

## (undated) DEVICE — PAD DEFIB CADENCE ADULT R2

## (undated) DEVICE — ADHESIVE DERMABOND ADVANCED

## (undated) DEVICE — COVER PROBE ADHESIVE 8X72IN

## (undated) DEVICE — DRAPE STERI INCISE 17X23IN